# Patient Record
Sex: FEMALE | Race: WHITE | NOT HISPANIC OR LATINO | Employment: UNEMPLOYED | URBAN - NONMETROPOLITAN AREA
[De-identification: names, ages, dates, MRNs, and addresses within clinical notes are randomized per-mention and may not be internally consistent; named-entity substitution may affect disease eponyms.]

---

## 2018-01-02 ENCOUNTER — CONSULT (OUTPATIENT)
Dept: CARDIOLOGY | Facility: CLINIC | Age: 68
End: 2018-01-02

## 2018-01-02 ENCOUNTER — TELEPHONE (OUTPATIENT)
Dept: CARDIOLOGY | Facility: CLINIC | Age: 68
End: 2018-01-02

## 2018-01-02 VITALS
SYSTOLIC BLOOD PRESSURE: 130 MMHG | BODY MASS INDEX: 34.73 KG/M2 | DIASTOLIC BLOOD PRESSURE: 100 MMHG | HEART RATE: 72 BPM | WEIGHT: 196 LBS | HEIGHT: 63 IN

## 2018-01-02 DIAGNOSIS — R73.9 HYPERGLYCEMIA: ICD-10-CM

## 2018-01-02 DIAGNOSIS — I10 ESSENTIAL HYPERTENSION: ICD-10-CM

## 2018-01-02 DIAGNOSIS — I49.5 SSS (SICK SINUS SYNDROME) (HCC): ICD-10-CM

## 2018-01-02 DIAGNOSIS — R00.2 PALPITATIONS: ICD-10-CM

## 2018-01-02 DIAGNOSIS — I49.3 PVC (PREMATURE VENTRICULAR CONTRACTION): ICD-10-CM

## 2018-01-02 DIAGNOSIS — E88.81 METABOLIC SYNDROME: ICD-10-CM

## 2018-01-02 DIAGNOSIS — E83.42 HYPOMAGNESEMIA: ICD-10-CM

## 2018-01-02 DIAGNOSIS — I49.5 SSS (SICK SINUS SYNDROME) (HCC): Primary | ICD-10-CM

## 2018-01-02 DIAGNOSIS — R01.1 MURMUR, CARDIAC: ICD-10-CM

## 2018-01-02 DIAGNOSIS — E78.00 HYPERCHOLESTEREMIA: ICD-10-CM

## 2018-01-02 PROCEDURE — 99204 OFFICE O/P NEW MOD 45 MIN: CPT | Performed by: INTERNAL MEDICINE

## 2018-01-02 PROCEDURE — 93000 ELECTROCARDIOGRAM COMPLETE: CPT | Performed by: INTERNAL MEDICINE

## 2018-01-02 RX ORDER — LOSARTAN POTASSIUM 100 MG/1
100 TABLET ORAL DAILY
Qty: 30 TABLET | Refills: 0 | Status: SHIPPED | OUTPATIENT
Start: 2018-01-02 | End: 2018-02-22 | Stop reason: SDUPTHER

## 2018-01-02 RX ORDER — LOSARTAN POTASSIUM 100 MG/1
100 TABLET ORAL DAILY
COMMUNITY
End: 2018-01-02 | Stop reason: SDUPTHER

## 2018-01-02 RX ORDER — LOSARTAN POTASSIUM 100 MG/1
100 TABLET ORAL DAILY
Qty: 90 TABLET | Refills: 4 | Status: SHIPPED | OUTPATIENT
Start: 2018-01-02 | End: 2019-03-15 | Stop reason: SDUPTHER

## 2018-01-02 NOTE — PROGRESS NOTES
CARDIAC COMPLAINTS  dyspnea and palpitations      Subjective   Brandie Nesbitt is a 67 y.o. female came in today for her initial cardiac evaluation.  She has history of hypertension who was been having symptoms of shortness of breath and palpitation for which she has undergone investigation about 3 years ago in [place].  She was told that she needed a pacemaker and had it done in 2014.  She did notice some improvement in the shortness of breath but she continues to have the palpitation.  She now recently moved to [place] and wants to be followed here.  She does have an appointment to see you tomorrow.  She was on lipid lowering medication in the past, but she has stopped taking it.  She does have family history of diabetes but is not sure when was the last time her sugar level was checked.  She does complain of palpitation and during that time she also feels like a lump in her throat.  She denies having any chest pain.  Her shortness of breath has improved but still present.  She is not a smoker.  She does have strong family history of ischemic heart disease.    Past Surgical History:   Procedure Laterality Date   • CARDIOVASCULAR STRESS TEST     • ECHO - CONVERTED     • PACEMAKER IMPLANTATION  11/26/2014    [employer]tr[employer] @ [facility]       Current Outpatient Prescriptions   Medication Sig Dispense Refill   • losartan (COZAAR) 100 MG tablet Take 100 mg by mouth Daily.     • metoprolol tartrate (LOPRESSOR) 25 MG tablet Take 1 tablet by mouth Every 12 (Twelve) Hours. 60 tablet 11     No current facility-administered medications for this visit.            ALLERGIES:  Review of patient's allergies indicates no known allergies.    Past Medical History:   Diagnosis Date   • Breast abrasion    • History of lumpectomy    • History of tonsillectomy    • Hypertension    • Pacemaker     Medtronic       History   Smoking Status   • Never Smoker   Smokeless Tobacco   • Never Used          Family History   Problem  "Relation Age of Onset   • Heart attack Mother    • Hypertension Mother    • Hyperlipidemia Mother    • Cancer Mother    • Stroke Father    • Hyperlipidemia Father    • Hypertension Father    • Cancer Father    • Heart disease Sister    • Diabetes Sister    • Stroke Sister    • Heart disease Brother    • Diabetes Brother    • Stroke Brother    • Stroke Maternal Grandmother    • Heart attack Maternal Grandfather    • Heart attack Paternal Grandmother    • Stroke Paternal Grandmother    • Heart attack Paternal Grandfather    • Stroke Paternal Grandfather        Review of Systems   Constitution: Negative for decreased appetite and malaise/fatigue.   HENT: Negative for congestion and sore throat.    Eyes: Negative for blurred vision.   Cardiovascular: Positive for dyspnea on exertion and palpitations. Negative for chest pain.   Respiratory: Positive for shortness of breath. Negative for snoring.    Endocrine: Negative for cold intolerance and heat intolerance.   Hematologic/Lymphatic: Negative for adenopathy. Does not bruise/bleed easily.   Skin: Negative for itching, nail changes and skin cancer.   Musculoskeletal: Negative for arthritis, gout and myalgias.   Gastrointestinal: Negative for abdominal pain, dysphagia and heartburn.   Genitourinary: Negative for bladder incontinence and frequency.   Neurological: Negative for dizziness, light-headedness, seizures and vertigo.   Psychiatric/Behavioral: Negative for altered mental status.   Allergic/Immunologic: Negative for environmental allergies and hives.       Diabetes- No  Thyroid- normal    Objective     /100 (BP Location: Left arm)  Pulse 72  Ht 160 cm (63\")  Wt 88.9 kg (196 lb)  BMI 34.72 kg/m2    Physical Exam   Constitutional: She is oriented to person, place, and time. She appears well-developed and well-nourished.   HENT:   Head: Normocephalic.   Nose: Nose normal.   Eyes: EOM are normal. Pupils are equal, round, and reactive to light.   Neck: Normal " range of motion. Neck supple.   Cardiovascular: Normal rate, regular rhythm, S1 normal and S2 normal.  Frequent extrasystoles are present.   Murmur heard.  Pulmonary/Chest: Effort normal and breath sounds normal.   Abdominal: Soft. Bowel sounds are normal.   Musculoskeletal: Normal range of motion. She exhibits no edema.   Neurological: She is alert and oriented to person, place, and time.   Skin: Skin is warm and dry.   Psychiatric: She has a normal mood and affect.         ECG 12 Lead  Date/Time: 1/2/2018 12:01 PM  Performed by: LISSETT SANCHES  Authorized by: LISSETT SANCHES   Previous ECG: no previous ECG available  Rhythm: paced  Ectopy: unifocal PVCs  Rate: normal  QRS axis: normal  Clinical impression: abnormal ECG              Assessment/Plan     Brandie was seen today for establish care.    Diagnoses and all orders for this visit:    SSS (sick sinus syndrome)  -     Adult Transthoracic Echo Complete W/ Cont if Necessary Per Protocol; Future  -     metoprolol tartrate (LOPRESSOR) 25 MG tablet; Take 1 tablet by mouth Every 12 (Twelve) Hours.  -     TSH; Future  -     ECG 12 Lead    Essential hypertension  -     Comprehensive Metabolic Panel; Future    Metabolic syndrome  -     CBC & Differential; Future    Hypercholesteremia  -     Lipid Panel; Future    Hyperglycemia  -     Hemoglobin A1c; Future    Murmur, cardiac  -     Adult Transthoracic Echo Complete W/ Cont if Necessary Per Protocol; Future    Palpitations  -     metoprolol tartrate (LOPRESSOR) 25 MG tablet; Take 1 tablet by mouth Every 12 (Twelve) Hours.  -     TSH; Future  -     ECG 12 Lead    Hypomagnesemia  -     Magnesium; Future    PVC (premature ventricular contraction)  -     ECG 12 Lead       At baseline her heart rate is stable but little irregular.  Her blood pressure is moderately elevated.  Her BMI is around 35.  Her EKG showed that she's been paced atrially and also has few PVC's.  Her clinical examination is unremarkable other  than short systolic murmur at the mitral area.  I started her on low-dose of beta blockers in the form of Lopressor 25 mg twice a day.  I scheduled her to have the pacemaker interrogated.  If there is still continues to have a lot of PVC's, then we will try 1C antiarrhythmic medication.  Also advised her to undergo lab work to check her lipids, electrolytes, magnesium, A1c and TSH level.  I also scheduled her to undergo an echocardiogram to evaluate the LV systolic function, valvular structures.  Based on the results of the test, further recommendation will be made.             Electronically signed by Estrellita Weldon MD January 2, 2018 12:03 PM

## 2018-01-02 NOTE — TELEPHONE ENCOUNTER
Patient was in for consult today. Upon checking out she was asking for refills on Losartan 100 mg.   Is she to continue taking the Losartan?

## 2018-01-02 NOTE — TELEPHONE ENCOUNTER
Script for Losartan also sent to [facility]. Patient wanted 1st fill to go to [facility].

## 2018-01-02 NOTE — TELEPHONE ENCOUNTER
Patient requesting a script for Metoprolol  25 mg to be sent to [facility] which is [facility].

## 2018-01-04 ENCOUNTER — HOSPITAL ENCOUNTER (OUTPATIENT)
Dept: CARDIOLOGY | Facility: HOSPITAL | Age: 68
Discharge: HOME | End: 2018-01-04
Attending: INTERNAL MEDICINE | Admitting: INTERNAL MEDICINE

## 2018-01-04 ENCOUNTER — OUTSIDE FACILITY SERVICE (OUTPATIENT)
Dept: CARDIOLOGY | Facility: CLINIC | Age: 68
End: 2018-01-04

## 2018-01-04 DIAGNOSIS — R01.1 MURMUR, CARDIAC: ICD-10-CM

## 2018-01-04 DIAGNOSIS — I49.5 SSS (SICK SINUS SYNDROME) (HCC): ICD-10-CM

## 2018-01-04 LAB
MAXIMAL PREDICTED HEART RATE: 155 BPM
STRESS TARGET HR: 132 BPM

## 2018-01-04 PROCEDURE — 93306 TTE W/DOPPLER COMPLETE: CPT | Performed by: INTERNAL MEDICINE

## 2018-01-04 PROCEDURE — 93306 TTE W/DOPPLER COMPLETE: CPT

## 2018-01-15 ENCOUNTER — TELEPHONE (OUTPATIENT)
Dept: CARDIOLOGY | Facility: CLINIC | Age: 68
End: 2018-01-15

## 2018-01-15 NOTE — TELEPHONE ENCOUNTER
"Patient notified of Echo results. Verbalizes understanding.    Dr. Weldon    Patient reports is doing okay with Beta blocker but her b/p still \" a little high\", upon further conversation with patient discovered she was only taking metoprolol once a day and it was ordered twice a day.     I advised patient to take as directed twice a day and if b/p continues to be elevated to contact our office. What are your recommendation's?      Questioned patient as to b/p reading's and she would states \"just a little high\".  "

## 2018-01-15 NOTE — TELEPHONE ENCOUNTER
Patient called in requesting result's of Echo. What are your recommendation's as to Echo? Thank you.

## 2018-01-15 NOTE — TELEPHONE ENCOUNTER
patient called stating she had an echo performed about 2 weeks ago and has yet to her back from those results. I spoke with patient and informed her that since she is followed by Dr. Weldon she would need to call their office for those results. Patient verbalize understanding.

## 2018-02-22 ENCOUNTER — TELEPHONE (OUTPATIENT)
Dept: CARDIOLOGY | Facility: CLINIC | Age: 68
End: 2018-02-22

## 2018-02-22 ENCOUNTER — CLINICAL SUPPORT (OUTPATIENT)
Dept: CARDIOLOGY | Facility: CLINIC | Age: 68
End: 2018-02-22

## 2018-02-22 DIAGNOSIS — I49.5 SSS (SICK SINUS SYNDROME) (HCC): Primary | ICD-10-CM

## 2018-02-22 PROCEDURE — 93000 ELECTROCARDIOGRAM COMPLETE: CPT | Performed by: INTERNAL MEDICINE

## 2018-02-22 RX ORDER — CHOLECALCIFEROL (VITAMIN D3) 50 MCG
2000 TABLET ORAL DAILY
COMMUNITY
End: 2023-01-17

## 2018-02-22 RX ORDER — ASPIRIN 325 MG
81 TABLET, DELAYED RELEASE (ENTERIC COATED) ORAL DAILY
COMMUNITY
End: 2019-07-02 | Stop reason: ALTCHOICE

## 2018-02-22 RX ORDER — ATORVASTATIN CALCIUM 10 MG/1
10 TABLET, FILM COATED ORAL DAILY
COMMUNITY
End: 2019-07-02 | Stop reason: SDUPTHER

## 2018-02-22 NOTE — PROGRESS NOTES
Procedure     ECG 12 Lead  Date/Time: 2/22/2018 2:34 PM  Performed by: LISSETT SANCHES  Authorized by: LISSETT SANCHES   Comparison: compared with previous ECG from 1/2/2018  Similar to previous ECG  Rhythm: paced  Ectopy: PVCs  Rate: normal  QRS axis: normal  Clinical impression: abnormal ECG

## 2018-02-22 NOTE — TELEPHONE ENCOUNTER
Pt was at physical therapy this morning.  States her pulse was 39 during the treatment. When I checked vitals her radial pulse was 44, apical was 65 and irreg, /80. EKG completed for you to review. Pt has PPM check scheduled for 2/28/18.  Med list up dated.  Pt complained of having congested cough/increased SOB. Advised to see PCP to R/O pneumonia due to just getting over the flu last week.

## 2018-02-28 ENCOUNTER — OFFICE VISIT (OUTPATIENT)
Dept: CARDIOLOGY | Facility: CLINIC | Age: 68
End: 2018-02-28

## 2018-02-28 DIAGNOSIS — I49.5 SSS (SICK SINUS SYNDROME) (HCC): Primary | ICD-10-CM

## 2018-02-28 DIAGNOSIS — R00.2 PALPITATIONS: ICD-10-CM

## 2018-02-28 PROCEDURE — 93288 INTERROG EVL PM/LDLS PM IP: CPT | Performed by: INTERNAL MEDICINE

## 2018-07-10 ENCOUNTER — OFFICE VISIT (OUTPATIENT)
Dept: CARDIOLOGY | Facility: CLINIC | Age: 68
End: 2018-07-10

## 2018-07-10 VITALS
WEIGHT: 192 LBS | BODY MASS INDEX: 34.02 KG/M2 | SYSTOLIC BLOOD PRESSURE: 120 MMHG | HEIGHT: 63 IN | HEART RATE: 60 BPM | DIASTOLIC BLOOD PRESSURE: 78 MMHG

## 2018-07-10 DIAGNOSIS — R00.2 PALPITATIONS: ICD-10-CM

## 2018-07-10 DIAGNOSIS — E78.00 HYPERCHOLESTEREMIA: ICD-10-CM

## 2018-07-10 DIAGNOSIS — I49.3 PVC (PREMATURE VENTRICULAR CONTRACTION): ICD-10-CM

## 2018-07-10 DIAGNOSIS — I10 ESSENTIAL HYPERTENSION: ICD-10-CM

## 2018-07-10 DIAGNOSIS — I49.5 SSS (SICK SINUS SYNDROME) (HCC): Primary | ICD-10-CM

## 2018-07-10 PROCEDURE — 93000 ELECTROCARDIOGRAM COMPLETE: CPT | Performed by: NURSE PRACTITIONER

## 2018-07-10 PROCEDURE — 99214 OFFICE O/P EST MOD 30 MIN: CPT | Performed by: NURSE PRACTITIONER

## 2018-07-10 RX ORDER — MELOXICAM 15 MG/1
15 TABLET ORAL DAILY PRN
COMMUNITY

## 2018-07-10 NOTE — PROGRESS NOTES
Chief Complaint   Patient presents with   • Follow-up     Cardiac management. Last labs about 6 weeks ago per PCP, and 2 weeks with urologist office.   • Pacemaker Check     Last MDT PPM check 2/28/18.       Diane Nesbitt is a 67 y.o. female with a history of hypertension and hyperlipidemia who developed shortness of breath and palpitations in 2014 and underwent investigation in [place] followed by Medtronic pacemaker placement.  She established care in January 2018 after moving to [place]. She reported palpitations at consult and was noted to have atrial pacing with occasional PVC.  Lopressor 25 mg BID added and labs and echo ordered.  Echo showed normal LV function and only mild MR. PA pressure borderline at 31 mmHg. Pacemaker interrogation on 2/28/18 showed 99% atrial pacing, 0.3% ventricular pacing with seven years battery life remaining. She came in today for follow up. She is tolerating beta blocker well and palpitations have significantly decreased. She denies chest pain or shortness of breath. No dizziness or syncope. Labs were done with PCP and was told lipids controlled but A1C 7.0%.  She was advised on strict diet and attended classes at the health department. She has lost four pounds. She was advised to undergo sleep apnea work up by PCP but has not done so yet. She had a repeat CT scan yesterday secondary to 2 mm renal stone that she feels she has now passed.  No refills needed.      HPI         Cardiac History:    Past Surgical History:   Procedure Laterality Date   • CARDIOVASCULAR STRESS TEST     • ECHO - CONVERTED     • ECHO - CONVERTED  01/04/2018    EF 65%. RVSP- 31 mmHg   • PACEMAKER IMPLANTATION  11/26/2014    [employer]tr[employer] @ [facility]]       Current Outpatient Prescriptions   Medication Sig Dispense Refill   • aspirin  MG tablet Take 81 mg by mouth Daily.     • atorvastatin (LIPITOR) 10 MG tablet Take 10 mg by mouth Daily.     • Cholecalciferol (VITAMIN D) 2000 units  tablet Take 2,000 Units by mouth Daily.     • cyanocobalamin ([facility] VITAMIN B-12) 2000 MCG tablet Take 2,000 mcg by mouth Daily.     • losartan (COZAAR) 100 MG tablet Take 1 tablet by mouth Daily. 90 tablet 4   • meloxicam (MOBIC) 15 MG tablet Take 15 mg by mouth Daily.     • metoprolol tartrate (LOPRESSOR) 25 MG tablet Take 1 tablet by mouth Every 12 (Twelve) Hours. 90 tablet 4     No current facility-administered medications for this visit.        Patient has no known allergies.    Past Medical History:   Diagnosis Date   • Breast abrasion    • Diabetes mellitus (CMS/HCC)    • History of kidney stones    • History of lumpectomy    • History of tonsillectomy    • Hypertension    • Pacemaker     Medtronic       Social History     Social History   • Marital status: Unknown     Spouse name: N/A   • Number of children: N/A   • Years of education: N/A     Occupational History   • Not on file.     Social History Main Topics   • Smoking status: Never Smoker   • Smokeless tobacco: Never Used   • Alcohol use Yes      Comment: occasionally   • Drug use: No   • Sexual activity: Not on file     Other Topics Concern   • Not on file     Social History Narrative   • No narrative on file       Family History   Problem Relation Age of Onset   • Heart attack Mother    • Hypertension Mother    • Hyperlipidemia Mother    • Cancer Mother    • Stroke Father    • Hyperlipidemia Father    • Hypertension Father    • Cancer Father    • Heart disease Sister    • Diabetes Sister    • Stroke Sister    • Heart disease Brother    • Diabetes Brother    • Stroke Brother    • Stroke Maternal Grandmother    • Heart attack Maternal Grandfather    • Heart attack Paternal Grandmother    • Stroke Paternal Grandmother    • Heart attack Paternal Grandfather    • Stroke Paternal Grandfather        Review of Systems   Constitution: Positive for weight loss (down 4 lb). Negative for decreased appetite, weakness and malaise/fatigue.   HENT: Negative.    Eyes:  "Negative.    Cardiovascular: Negative for chest pain, dyspnea on exertion, leg swelling, orthopnea, palpitations and syncope.   Respiratory: Negative for cough and shortness of breath.    Endocrine: Negative.    Hematologic/Lymphatic: Negative for bleeding problem. Does not bruise/bleed easily.   Skin: Negative.    Musculoskeletal: Negative for falls and myalgias.   Gastrointestinal: Negative for abdominal pain, change in bowel habit, constipation, diarrhea and melena.   Genitourinary: Positive for hematuria (reent renal calculi).   Neurological: Negative for dizziness.   Psychiatric/Behavioral: Negative for altered mental status and depression.   Allergic/Immunologic: Negative.       Diabetes- Yes  Thyroid-normal    Objective     /78 (BP Location: Left arm)   Pulse 60   Ht 160 cm (62.99\")   Wt 87.1 kg (192 lb)   BMI 34.02 kg/m²     Physical Exam   Constitutional: She is oriented to person, place, and time. She appears well-developed and well-nourished.   HENT:   Head: Normocephalic.   Eyes: Pupils are equal, round, and reactive to light.   Neck: Normal range of motion.   Cardiovascular: Normal rate, regular rhythm and intact distal pulses.    Pulmonary/Chest: Effort normal and breath sounds normal. No respiratory distress. She has no wheezes. She has no rales.   Abdominal: Soft. Bowel sounds are normal. She exhibits no distension.   Musculoskeletal: Normal range of motion. She exhibits no edema.   Neurological: She is alert and oriented to person, place, and time.   Skin: Skin is warm and dry.   Psychiatric: She has a normal mood and affect.   Nursing note and vitals reviewed.       ECG 12 Lead  Date/Time: 7/10/2018 2:50 PM  Performed by: MARKO SONG  Authorized by: MARKO SONG   Rhythm: paced  Rate: normal  BPM: 59  Clinical impression: non-specific ECG  Comments: Atrial pacing, ventricular sensing  No PVC                  Assessment/Plan    Heart rate stable. Blood pressure is normal today. EKG done " today for pacemaker management showed atrial pacing, ventricular sensing with no PVC noted.  She is tolerating Lopressor which seems to be suppressing the ventricular beats.  Continue the same.  We reviewed the findings of the echocardiogram which were stable.  Stress test not done as she is asymptomatic. We discussed the A1C and diet in detail.  She was given DASH diet and advised to strictly limit carbohydrates. We discussed her medication and she is on aspirin, statin, ARB, and BB.  No changes made.  She will be scheduled for Medtronic pacemaker interrogation next month. Will request labs from Karlee's office for her chart here.  No refills needed today.  If she decides to proceed with sleep apnea work up, she will discuss with you further.  We will see her back in six months or sooner if any new symptoms develop.      Brandie was seen today for follow-up and pacemaker check.    Diagnoses and all orders for this visit:    SSS (sick sinus syndrome) (CMS/HCC)    Essential hypertension    Hypercholesteremia    Palpitations    PVC (premature ventricular contraction)    Other orders  -     ECG 12 Lead        Patient's Body mass index is 34.02 kg/m². BMI is above normal parameters. Recommendations include: nutrition counseling.                   Electronically signed by TROY Bonilla,  July 10, 2018 2:57 PM

## 2018-07-10 NOTE — PATIENT INSTRUCTIONS
"DASH Eating Plan  DASH stands for \"Dietary Approaches to Stop Hypertension.\" The DASH eating plan is a healthy eating plan that has been shown to reduce high blood pressure (hypertension). It may also reduce your risk for type 2 diabetes, heart disease, and stroke. The DASH eating plan may also help with weight loss.  What are tips for following this plan?  General guidelines  · Avoid eating more than 2,300 mg (milligrams) of salt (sodium) a day. If you have hypertension, you may need to reduce your sodium intake to 1,500 mg a day.  · Limit alcohol intake to no more than 1 drink a day for nonpregnant women and 2 drinks a day for men. One drink equals 12 oz of beer, 5 oz of wine, or 1½ oz of hard liquor.  · Work with your health care provider to maintain a healthy body weight or to lose weight. Ask what an ideal weight is for you.  · Get at least 30 minutes of exercise that causes your heart to beat faster (aerobic exercise) most days of the week. Activities may include walking, swimming, or biking.  · Work with your health care provider or diet and nutrition specialist (dietitian) to adjust your eating plan to your individual calorie needs.  Reading food labels  · Check food labels for the amount of sodium per serving. Choose foods with less than 5 percent of the Daily Value of sodium. Generally, foods with less than 300 mg of sodium per serving fit into this eating plan.  · To find whole grains, look for the word \"whole\" as the first word in the ingredient list.  Shopping  · Buy products labeled as \"low-sodium\" or \"no salt added.\"  · Buy fresh foods. Avoid canned foods and premade or frozen meals.  Cooking  · Avoid adding salt when cooking. Use salt-free seasonings or herbs instead of table salt or sea salt. Check with your health care provider or pharmacist before using salt substitutes.  · Do not eid foods. Cook foods using healthy methods such as baking, boiling, grilling, and broiling instead.  · Cook with " heart-healthy oils, such as olive, canola, soybean, or sunflower oil.  Meal planning    · Eat a balanced diet that includes:  ? 5 or more servings of fruits and vegetables each day. At each meal, try to fill half of your plate with fruits and vegetables.  ? Up to 6-8 servings of whole grains each day.  ? Less than 6 oz of lean meat, poultry, or fish each day. A 3-oz serving of meat is about the same size as a deck of cards. One egg equals 1 oz.  ? 2 servings of low-fat dairy each day.  ? A serving of nuts, seeds, or beans 5 times each week.  ? Heart-healthy fats. Healthy fats called Omega-3 fatty acids are found in foods such as flaxseeds and coldwater fish, like sardines, salmon, and mackerel.  · Limit how much you eat of the following:  ? Canned or prepackaged foods.  ? Food that is high in trans fat, such as fried foods.  ? Food that is high in saturated fat, such as fatty meat.  ? Sweets, desserts, sugary drinks, and other foods with added sugar.  ? Full-fat dairy products.  · Do not salt foods before eating.  · Try to eat at least 2 vegetarian meals each week.  · Eat more home-cooked food and less restaurant, buffet, and fast food.  · When eating at a restaurant, ask that your food be prepared with less salt or no salt, if possible.  What foods are recommended?  The items listed may not be a complete list. Talk with your dietitian about what dietary choices are best for you.  Grains  Whole-grain or whole-wheat bread. Whole-grain or whole-wheat pasta. Brown rice. Oatmeal. Quinoa. Bulgur. Whole-grain and low-sodium cereals. Ilana bread. Low-fat, low-sodium crackers. Whole-wheat flour tortillas.  Vegetables  Fresh or frozen vegetables (raw, steamed, roasted, or grilled). Low-sodium or reduced-sodium tomato and vegetable juice. Low-sodium or reduced-sodium tomato sauce and tomato paste. Low-sodium or reduced-sodium canned vegetables.  Fruits  All fresh, dried, or frozen fruit. Canned fruit in natural juice (without  added sugar).  Meat and other protein foods  Skinless chicken or turkey. Ground chicken or turkey. Pork with fat trimmed off. Fish and seafood. Egg whites. Dried beans, peas, or lentils. Unsalted nuts, nut butters, and seeds. Unsalted canned beans. Lean cuts of beef with fat trimmed off. Low-sodium, lean deli meat.  Dairy  Low-fat (1%) or fat-free (skim) milk. Fat-free, low-fat, or reduced-fat cheeses. Nonfat, low-sodium ricotta or cottage cheese. Low-fat or nonfat yogurt. Low-fat, low-sodium cheese.  Fats and oils  Soft margarine without trans fats. Vegetable oil. Low-fat, reduced-fat, or light mayonnaise and salad dressings (reduced-sodium). Canola, safflower, olive, soybean, and sunflower oils. Avocado.  Seasoning and other foods  Herbs. Spices. Seasoning mixes without salt. Unsalted popcorn and pretzels. Fat-free sweets.  What foods are not recommended?  The items listed may not be a complete list. Talk with your dietitian about what dietary choices are best for you.  Grains  Baked goods made with fat, such as croissants, muffins, or some breads. Dry pasta or rice meal packs.  Vegetables  Creamed or fried vegetables. Vegetables in a cheese sauce. Regular canned vegetables (not low-sodium or reduced-sodium). Regular canned tomato sauce and paste (not low-sodium or reduced-sodium). Regular tomato and vegetable juice (not low-sodium or reduced-sodium). Pickles. Olives.  Fruits  Canned fruit in a light or heavy syrup. Fried fruit. Fruit in cream or butter sauce.  Meat and other protein foods  Fatty cuts of meat. Ribs. Fried meat. Beach. Sausage. Bologna and other processed lunch meats. Salami. Fatback. Hotdogs. Bratwurst. Salted nuts and seeds. Canned beans with added salt. Canned or smoked fish. Whole eggs or egg yolks. Chicken or turkey with skin.  Dairy  Whole or 2% milk, cream, and half-and-half. Whole or full-fat cream cheese. Whole-fat or sweetened yogurt. Full-fat cheese. Nondairy creamers. Whipped toppings.  Processed cheese and cheese spreads.  Fats and oils  Butter. Stick margarine. Lard. Shortening. Ghee. Beach fat. Tropical oils, such as coconut, palm kernel, or palm oil.  Seasoning and other foods  Salted popcorn and pretzels. Onion salt, garlic salt, seasoned salt, table salt, and sea salt. Worcestershire sauce. Tartar sauce. Barbecue sauce. Teriyaki sauce. Soy sauce, including reduced-sodium. Steak sauce. Canned and packaged gravies. Fish sauce. Oyster sauce. Cocktail sauce. Horseradish that you find on the shelf. Ketchup. Mustard. Meat flavorings and tenderizers. Bouillon cubes. Hot sauce and Tabasco sauce. Premade or packaged marinades. Premade or packaged taco seasonings. Relishes. Regular salad dressings.  Where to find more information:  · National Heart, Lung, and Blood [place]: www.nhlbi.nih.gov  · American Heart Association: www.heart.org  Summary  · The DASH eating plan is a healthy eating plan that has been shown to reduce high blood pressure (hypertension). It may also reduce your risk for type 2 diabetes, heart disease, and stroke.  · With the DASH eating plan, you should limit salt (sodium) intake to 2,300 mg a day. If you have hypertension, you may need to reduce your sodium intake to 1,500 mg a day.  · When on the DASH eating plan, aim to eat more fresh fruits and vegetables, whole grains, lean proteins, low-fat dairy, and heart-healthy fats.  · Work with your health care provider or diet and nutrition specialist (dietitian) to adjust your eating plan to your individual calorie needs.  This information is not intended to replace advice given to you by your health care provider. Make sure you discuss any questions you have with your health care provider.  Document Released: 12/06/2012 Document Revised: 12/11/2017 Document Reviewed: 12/11/2017  [employer] Patient Education © 2018 [employer]

## 2018-08-22 ENCOUNTER — OFFICE VISIT (OUTPATIENT)
Dept: CARDIOLOGY | Facility: CLINIC | Age: 68
End: 2018-08-22

## 2018-08-22 DIAGNOSIS — R00.2 PALPITATIONS: ICD-10-CM

## 2018-08-22 DIAGNOSIS — I49.5 SSS (SICK SINUS SYNDROME) (HCC): Primary | ICD-10-CM

## 2018-08-22 PROCEDURE — 93288 INTERROG EVL PM/LDLS PM IP: CPT | Performed by: INTERNAL MEDICINE

## 2018-12-10 ENCOUNTER — OFFICE VISIT (OUTPATIENT)
Dept: CARDIOLOGY | Facility: CLINIC | Age: 68
End: 2018-12-10

## 2018-12-10 VITALS
DIASTOLIC BLOOD PRESSURE: 84 MMHG | HEART RATE: 71 BPM | BODY MASS INDEX: 34.02 KG/M2 | HEIGHT: 63 IN | WEIGHT: 192 LBS | SYSTOLIC BLOOD PRESSURE: 130 MMHG

## 2018-12-10 DIAGNOSIS — Z95.0 PRESENCE OF PERMANENT CARDIAC PACEMAKER: ICD-10-CM

## 2018-12-10 DIAGNOSIS — E78.00 HYPERCHOLESTEREMIA: ICD-10-CM

## 2018-12-10 DIAGNOSIS — E66.9 OBESITY (BMI 30.0-34.9): ICD-10-CM

## 2018-12-10 DIAGNOSIS — I49.5 SSS (SICK SINUS SYNDROME) (HCC): Primary | ICD-10-CM

## 2018-12-10 DIAGNOSIS — I10 ESSENTIAL HYPERTENSION: ICD-10-CM

## 2018-12-10 DIAGNOSIS — I49.3 PVC (PREMATURE VENTRICULAR CONTRACTION): ICD-10-CM

## 2018-12-10 PROCEDURE — 93000 ELECTROCARDIOGRAM COMPLETE: CPT | Performed by: NURSE PRACTITIONER

## 2018-12-10 PROCEDURE — 99214 OFFICE O/P EST MOD 30 MIN: CPT | Performed by: NURSE PRACTITIONER

## 2018-12-10 RX ORDER — METOPROLOL TARTRATE 50 MG/1
50 TABLET, FILM COATED ORAL 2 TIMES DAILY
Qty: 180 TABLET | Refills: 3 | Status: SHIPPED | OUTPATIENT
Start: 2018-12-10 | End: 2018-12-10 | Stop reason: DRUGHIGH

## 2018-12-10 NOTE — PROGRESS NOTES
Chief Complaint   Patient presents with   • Follow-up     For cardiac management. Patient is on aspirin. Last lab work was done a couple of days ago per PCP, not in chart.    • Pacemaker Check     Last MDT PPM check was done on 08/22/18 per our office.    • Med Refill     Did not bring medication list.        Cardiac Complaints  none      Subjective   Brandie Nesbitt is a 68 y.o. female with hypertension and hyperlipidemia who developed shortness of breath and palpitations in 2014 and underwent investigation in [place] followed by Medtronic pacemaker placement for SSS.  She established care in January 2018 after moving to [place]. She reported palpitations at consult and was noted to have atrial pacing with occasional PVCs.  Lopressor 25 mg BID was added and labs and echo ordered.  Echo showed normal LV function and only mild MR. PA pressure borderline at 31 mmHg. Most recent pacer check in August showed 99% atrial pacing with 7 years of battery life remaining and normal function.  She returns today for follow up and cardiac symptoms are denied.  When questioned about palpitations, patient reports she has lived with them so long she does not notice them any longer.  Chest pain and shortness of breath are denied.  Labs she admits were done several days ago with PCP.  Most recent show BUN 20, creatinine 0.9, Na 137, K 4.3, trig 126, HDL 32, LDL 78, and AIC 6.5%. No refills needed as they are done with PCP.        Cardiac History  Past Surgical History:   Procedure Laterality Date   • CARDIOVASCULAR STRESS TEST     • ECHO - CONVERTED     • ECHO - CONVERTED  01/04/2018    EF 65%. RVSP- 31 mmHg   • PACEMAKER IMPLANTATION  11/26/2014    [employer]tr[employer] @ [facility]       Current Outpatient Medications   Medication Sig Dispense Refill   • aspirin  MG tablet Take 81 mg by mouth Daily.     • atorvastatin (LIPITOR) 10 MG tablet Take 10 mg by mouth Daily.     • Cholecalciferol (VITAMIN D) 2000 units tablet Take 2,000 Units  by mouth Daily.     • cyanocobalamin (CVS VITAMIN B-12) 2000 MCG tablet Take 2,000 mcg by mouth Daily.     • losartan (COZAAR) 100 MG tablet Take 1 tablet by mouth Daily. 90 tablet 4   • meloxicam (MOBIC) 15 MG tablet Take 15 mg by mouth Daily.     • metoprolol tartrate (LOPRESSOR) 25 MG tablet Take 1 tablet by mouth 2 (Two) Times a Day. 60 tablet 11     No current facility-administered medications for this visit.        Patient has no known allergies.    Past Medical History:   Diagnosis Date   • Breast abrasion    • Diabetes mellitus (CMS/HCC)    • History of kidney stones    • History of lumpectomy    • History of tonsillectomy    • Hypertension    • Pacemaker     Medtronic       Social History     Socioeconomic History   • Marital status: Unknown     Spouse name: Not on file   • Number of children: Not on file   • Years of education: Not on file   • Highest education level: Not on file   Social Needs   • Financial resource strain: Not on file   • Food insecurity - worry: Not on file   • Food insecurity - inability: Not on file   • Transportation needs - medical: Not on file   • Transportation needs - non-medical: Not on file   Occupational History   • Not on file   Tobacco Use   • Smoking status: Never Smoker   • Smokeless tobacco: Never Used   Substance and Sexual Activity   • Alcohol use: Yes     Comment: occasionally   • Drug use: No   • Sexual activity: Not on file   Other Topics Concern   • Not on file   Social History Narrative   • Not on file       Family History   Problem Relation Age of Onset   • Heart attack Mother    • Hypertension Mother    • Hyperlipidemia Mother    • Cancer Mother    • Stroke Father    • Hyperlipidemia Father    • Hypertension Father    • Cancer Father    • Heart disease Sister    • Diabetes Sister    • Stroke Sister    • Heart disease Brother    • Diabetes Brother    • Stroke Brother    • Stroke Maternal Grandmother    • Heart attack Maternal Grandfather    • Heart attack Paternal  "Grandmother    • Stroke Paternal Grandmother    • Heart attack Paternal Grandfather    • Stroke Paternal Grandfather        Review of Systems   Constitution: Negative for weakness and malaise/fatigue.   Cardiovascular: Negative for chest pain, dyspnea on exertion, irregular heartbeat, leg swelling, orthopnea, palpitations and syncope.   Respiratory: Negative for cough, shortness of breath and wheezing.    Musculoskeletal: Negative for back pain, joint pain and joint swelling.   Gastrointestinal: Negative for anorexia, heartburn, nausea and vomiting.   Genitourinary: Negative for dysuria, hematuria, hesitancy and nocturia.   Neurological: Negative for dizziness, light-headedness and loss of balance.   Psychiatric/Behavioral: Negative for depression and memory loss. The patient is not nervous/anxious.            Objective     /84 (BP Location: Left arm)   Pulse 71   Ht 160 cm (62.99\")   Wt 87.1 kg (192 lb)   BMI 34.02 kg/m²     Physical Exam   Constitutional: She is oriented to person, place, and time. She appears well-developed and well-nourished.   HENT:   Head: Normocephalic and atraumatic.   Eyes: EOM are normal. Pupils are equal, round, and reactive to light.   Neck: Normal range of motion. Neck supple.   Cardiovascular: Normal rate. A regularly irregular rhythm present.   Murmur heard.  Pulmonary/Chest: Effort normal and breath sounds normal.   Abdominal: Soft.   Musculoskeletal: Normal range of motion.   Neurological: She is alert and oriented to person, place, and time.   Skin: Skin is warm and dry.   Psychiatric: She has a normal mood and affect. Her behavior is normal.         ECG 12 Lead  Date/Time: 12/10/2018 9:19 AM  Performed by: Michelle Ramos APRN  Authorized by: Michelle Ramos APRN   Rhythm: paced  Ectopy: unifocal PVCs and frequent PVCs  BPM: 71  Clinical impression: abnormal ECG            Assessment/Plan     HR is stable today and BP high side of normal.  Patient admits to only " taking metoprolol once daily most days and was urged to increase to BID dosing as recommended.  EKG done today for SSS with PPM and PVC shows an atrial paced rhythm with trigeminal pattern, cardiac symptoms denied.  No symptoms are reported and palpitations are denied.  For now, since she is only taking metoprolol once daily, she will be urged to add the evening dose.  Labs were recently done with your office, copy available shows no electrolyte imbalance.  Lipids remain managed with statin therapy, no current FLP available, but tolerance of medication reported.  BMI elevated at 34.02, good cardiac diet with limited caloric intake, saturated fat, and activity as tolerated advised.  6 month follow up scheduled or sooner if needed.        Problems Addressed this Visit        Cardiovascular and Mediastinum    SSS (sick sinus syndrome) (CMS/HCC) - Primary    Relevant Medications    metoprolol tartrate (LOPRESSOR) 25 MG tablet    Other Relevant Orders    ECG 12 Lead    Essential hypertension    Relevant Medications    metoprolol tartrate (LOPRESSOR) 25 MG tablet    Hypercholesteremia    PVC (premature ventricular contraction)    Relevant Medications    metoprolol tartrate (LOPRESSOR) 25 MG tablet    Other Relevant Orders    ECG 12 Lead      Other Visit Diagnoses     Obesity (BMI 30.0-34.9)        Presence of permanent cardiac pacemaker              Patient's Body mass index is 34.02 kg/m². BMI is above normal parameters. Recommendations include: nutrition counseling.        Electronically signed by TROY Moncada December 10, 2018 3:49 PM

## 2018-12-17 ENCOUNTER — TELEPHONE (OUTPATIENT)
Dept: CARDIOLOGY | Facility: CLINIC | Age: 68
End: 2018-12-17

## 2018-12-17 NOTE — TELEPHONE ENCOUNTER
Patient called and reports that she has been feeling her PVC's since last week and reports that she has been feeling bad when having them. She reports that when she received metoprolol from pharmacy and it was 50 mg BID. I do not see where it was increased. She reports that she had just been taking 25 mg BID, but that this morning she did take 50 mg and she is still feeling bad.       She is wondering if you have any recommendations?

## 2018-12-17 NOTE — TELEPHONE ENCOUNTER
Patient was made aware of change of beta blocker to sotalol 40 mg BID.     She is asking if you thought it would be ok to just take metoprolol 50 mg BID and see if that helps before switching to the sotalol due to cost. I told her I would talk with you and call her back.

## 2018-12-17 NOTE — TELEPHONE ENCOUNTER
Patient aware ok to take metoprolol 50 mg BID and to call us back with any problems. No EKG at this time.

## 2018-12-21 ENCOUNTER — TELEPHONE (OUTPATIENT)
Dept: CARDIOLOGY | Facility: CLINIC | Age: 68
End: 2018-12-21

## 2018-12-21 RX ORDER — DILTIAZEM HYDROCHLORIDE 120 MG/1
120 CAPSULE, COATED, EXTENDED RELEASE ORAL DAILY
Qty: 30 CAPSULE | Refills: 5 | Status: SHIPPED | OUTPATIENT
Start: 2018-12-21 | End: 2019-07-02 | Stop reason: ALTCHOICE

## 2018-12-21 NOTE — TELEPHONE ENCOUNTER
I will not be here for one week.  I had tried to start her on antiarrythmic therapy which she declined.  I can not do that now as I can not monitor her rhythm.  Decrease back to the 25mg BID and add cardizem 120mg.  If no better, must go to ER

## 2018-12-21 NOTE — TELEPHONE ENCOUNTER
Patient came by office this morning and stated that she is still having PVC's and it is hard for her to sleep. I called patient back and she reports that taking the metoprolol 50 mg BID has not improved them and she almost feels worse.      What are your recommendations?

## 2018-12-21 NOTE — TELEPHONE ENCOUNTER
Patient was made aware to decrease metoprolol to 25 mg BID and made aware of addition of cardizem 120 mg QD. She was made aware that if symptoms do not improve that she must go to the ER, she verbalized understanding.     Script for cardizem  mg QD sent to [facility] in [place] with 30 tablets and 5 refills.

## 2019-01-07 ENCOUNTER — TELEPHONE (OUTPATIENT)
Dept: CARDIOLOGY | Facility: CLINIC | Age: 69
End: 2019-01-07

## 2019-01-07 NOTE — TELEPHONE ENCOUNTER
PATIENT CALLED AND LEFT A VM THAT NEEDED AN APPT. PATIENT WAS JUST SEEN 12/10/2018 SO COULD YOU PLEASE CALL PATIENT TOMORROW AND SEE WHAT IS GOING ON?  THANK YOU.

## 2019-01-08 NOTE — TELEPHONE ENCOUNTER
Patient called and stated that she has been having PVC's for a couple of weeks and that she want so to see Dr. Weldon.     Patient was asked if she wanted me to speak with Michelle about cardizem  mg QD not controlling. She said that she did not want that and she only wanted an appointment with Dr. Weldon only. Per telephone encounter on 12/17/18 after Michelle recommended changing metoprolol 50 mg BID to sotalol 40 mg BID, patient refused changing to sotalol due to cost.

## 2019-02-17 DIAGNOSIS — I49.5 SSS (SICK SINUS SYNDROME) (HCC): ICD-10-CM

## 2019-02-17 DIAGNOSIS — R00.2 PALPITATIONS: ICD-10-CM

## 2019-03-18 RX ORDER — LOSARTAN POTASSIUM 100 MG/1
TABLET ORAL
Qty: 90 TABLET | Refills: 0 | Status: SHIPPED | OUTPATIENT
Start: 2019-03-18 | End: 2019-07-02 | Stop reason: SDUPTHER

## 2019-03-18 NOTE — TELEPHONE ENCOUNTER
Spoke with patient concerning current medication, patient reports she is taking Losartan 100mg daily.

## 2019-05-22 ENCOUNTER — OFFICE VISIT (OUTPATIENT)
Dept: CARDIOLOGY | Facility: CLINIC | Age: 69
End: 2019-05-22

## 2019-05-22 DIAGNOSIS — I49.5 SSS (SICK SINUS SYNDROME) (HCC): Primary | ICD-10-CM

## 2019-05-22 DIAGNOSIS — R00.2 PALPITATIONS: ICD-10-CM

## 2019-05-22 PROCEDURE — 93288 INTERROG EVL PM/LDLS PM IP: CPT | Performed by: INTERNAL MEDICINE

## 2019-07-01 NOTE — PROGRESS NOTES
No chief complaint on file.      Subjective       Brandie Nesbitt is a 68 y.o. female  with hypertension and hyperlipidemia who developed shortness of breath and palpitations in 2014 and underwent investigation in [place] followed by Medtronic pacemaker placement for SSS.  She established care in January 2018 after moving to [place]. She reported palpitations at consult and was noted to have atrial pacing with occasional PVCs.  Lopressor 25 mg BID was added and labs and echo ordered.  Echo showed normal LV function and only mild MR. PA pressure borderline at 31 mmHg. [facility] [place]     Cardiac History:    Past Surgical History:   Procedure Laterality Date   • CARDIOVASCULAR STRESS TEST     • ECHO - CONVERTED     • ECHO - CONVERTED  01/04/2018    EF 65%. RVSP- 31 mmHg   • PACEMAKER IMPLANTATION  11/26/2014    [employer]tr[employer] @ [facility]       Current Outpatient Medications   Medication Sig Dispense Refill   • aspirin  MG tablet Take 81 mg by mouth Daily.     • atorvastatin (LIPITOR) 10 MG tablet Take 10 mg by mouth Daily.     • Cholecalciferol (VITAMIN D) 2000 units tablet Take 2,000 Units by mouth Daily.     • cyanocobalamin (CVS VITAMIN B-12) 2000 MCG tablet Take 2,000 mcg by mouth Daily.     • diltiaZEM CD (CARDIZEM CD) 120 MG 24 hr capsule Take 1 capsule by mouth Daily. 30 capsule 5   • losartan (COZAAR) 100 MG tablet TAKE 1 TABLET DAILY 90 tablet 0   • meloxicam (MOBIC) 15 MG tablet Take 15 mg by mouth Daily.     • metoprolol tartrate (LOPRESSOR) 25 MG tablet Take 1 tablet by mouth 2 (Two) Times a Day. 60 tablet 11   • metoprolol tartrate (LOPRESSOR) 25 MG tablet TAKE 1 TABLET EVERY 12     HOURS 90 tablet 0     No current facility-administered medications for this visit.        Patient has no known allergies.    Past Medical History:   Diagnosis Date   • Breast abrasion    • Diabetes mellitus (CMS/HCC)    • History of kidney stones    • History of lumpectomy    • History of tonsillectomy     • Hypertension    • Pacemaker     Medtronic       Social History     Socioeconomic History   • Marital status: Unknown     Spouse name: Not on file   • Number of children: Not on file   • Years of education: Not on file   • Highest education level: Not on file   Tobacco Use   • Smoking status: Never Smoker   • Smokeless tobacco: Never Used   Substance and Sexual Activity   • Alcohol use: Yes     Comment: occasionally   • Drug use: No       Family History   Problem Relation Age of Onset   • Heart attack Mother    • Hypertension Mother    • Hyperlipidemia Mother    • Cancer Mother    • Stroke Father    • Hyperlipidemia Father    • Hypertension Father    • Cancer Father    • Heart disease Sister    • Diabetes Sister    • Stroke Sister    • Heart disease Brother    • Diabetes Brother    • Stroke Brother    • Stroke Maternal Grandmother    • Heart attack Maternal Grandfather    • Heart attack Paternal Grandmother    • Stroke Paternal Grandmother    • Heart attack Paternal Grandfather    • Stroke Paternal Grandfather        ROS     Objective     There were no vitals taken for this visit.    Physical Exam     Procedures        Assessment/Plan      There are no diagnoses linked to this encounter.    Patient's There is no height or weight on file to calculate BMI. BMI is {BMI range:72590}.       I advised Brandie of the risks of continuing to use tobacco, and I provided her with tobacco cessation educational materials in the After Visit Summary.     During this visit, I spent *** minutes counseling the patient regarding tobacco cessation.                Electronically signed by TROY Wright,  July 1, 2019 3:58 PM

## 2019-07-02 ENCOUNTER — OFFICE VISIT (OUTPATIENT)
Dept: CARDIOLOGY | Facility: CLINIC | Age: 69
End: 2019-07-02

## 2019-07-02 VITALS
BODY MASS INDEX: 35.26 KG/M2 | HEART RATE: 66 BPM | DIASTOLIC BLOOD PRESSURE: 88 MMHG | HEIGHT: 63 IN | WEIGHT: 199 LBS | SYSTOLIC BLOOD PRESSURE: 140 MMHG

## 2019-07-02 DIAGNOSIS — I10 ESSENTIAL HYPERTENSION: ICD-10-CM

## 2019-07-02 DIAGNOSIS — I49.5 SSS (SICK SINUS SYNDROME) (HCC): Primary | ICD-10-CM

## 2019-07-02 DIAGNOSIS — E11.9 TYPE 2 DIABETES MELLITUS WITHOUT COMPLICATION, WITHOUT LONG-TERM CURRENT USE OF INSULIN (HCC): ICD-10-CM

## 2019-07-02 DIAGNOSIS — R60.0 BILATERAL LEG EDEMA: ICD-10-CM

## 2019-07-02 DIAGNOSIS — E78.00 HYPERCHOLESTEREMIA: ICD-10-CM

## 2019-07-02 DIAGNOSIS — R79.89 ELEVATED TSH: ICD-10-CM

## 2019-07-02 DIAGNOSIS — R00.2 PALPITATIONS: ICD-10-CM

## 2019-07-02 DIAGNOSIS — E66.01 SEVERE OBESITY (BMI 35.0-39.9) WITH COMORBIDITY (HCC): ICD-10-CM

## 2019-07-02 DIAGNOSIS — I49.3 PVC (PREMATURE VENTRICULAR CONTRACTION): ICD-10-CM

## 2019-07-02 PROCEDURE — 99214 OFFICE O/P EST MOD 30 MIN: CPT | Performed by: NURSE PRACTITIONER

## 2019-07-02 PROCEDURE — 93000 ELECTROCARDIOGRAM COMPLETE: CPT | Performed by: NURSE PRACTITIONER

## 2019-07-02 RX ORDER — ATORVASTATIN CALCIUM 10 MG/1
10 TABLET, FILM COATED ORAL DAILY
Qty: 90 TABLET | Refills: 2 | Status: SHIPPED | OUTPATIENT
Start: 2019-07-02 | End: 2020-01-02 | Stop reason: SDUPTHER

## 2019-07-02 RX ORDER — HYDROCHLOROTHIAZIDE 25 MG/1
25 TABLET ORAL DAILY
Qty: 90 TABLET | Refills: 2 | Status: SHIPPED | OUTPATIENT
Start: 2019-07-02 | End: 2020-01-02 | Stop reason: SDUPTHER

## 2019-07-02 RX ORDER — LOSARTAN POTASSIUM 100 MG/1
100 TABLET ORAL DAILY
Qty: 90 TABLET | Refills: 2 | Status: SHIPPED | OUTPATIENT
Start: 2019-07-02 | End: 2020-01-02 | Stop reason: SDUPTHER

## 2019-07-02 RX ORDER — METOPROLOL TARTRATE 50 MG/1
50 TABLET, FILM COATED ORAL 2 TIMES DAILY
Qty: 180 TABLET | Refills: 3 | Status: SHIPPED | OUTPATIENT
Start: 2019-07-02 | End: 2020-01-02 | Stop reason: SDUPTHER

## 2019-07-02 RX ORDER — ASPIRIN 81 MG/1
81 TABLET ORAL DAILY
COMMUNITY

## 2019-07-02 NOTE — PROGRESS NOTES
Chief Complaint   Patient presents with   • Follow-up     For cardiac management. Patient is on aspirin.    • Pacemaker Check     Last MDT PPM check was done on 05/22/19 per our office.    • Med Refill       Cardiac Complaints  {NT symptoms.:86484}      Subjective   Brandie Nesbitt is a 68 y.o. female with hypertension, hyperlipidemia, palpitations, and SSS in 2014 when she had a [employer] pacemaker placed in [facility].  She established care in January 2018 after moving to [place]. She reported palpitations at consult and was noted to have atrial pacing with occasional PVCs.  Lopressor 25 mg BID was added and labs and echo ordered.  Echo showed normal LV function and only mild MR. PA pressure borderline at 31 mmHg.  Most recent pacer check in May of 2019 with our office showed 96% RA pacing with 6 years of battery life remaining and no events noted.                Cardiac History  Past Surgical History:   Procedure Laterality Date   • CARDIOVASCULAR STRESS TEST     • ECHO - CONVERTED     • ECHO - CONVERTED  01/04/2018    EF 65%. RVSP- 31 mmHg   • PACEMAKER IMPLANTATION  11/26/2014    [employer]tr[employer] @ [facility]       Current Outpatient Medications   Medication Sig Dispense Refill   • aspirin  MG tablet Take 81 mg by mouth Daily.     • atorvastatin (LIPITOR) 10 MG tablet Take 10 mg by mouth Daily.     • Cholecalciferol (VITAMIN D) 2000 units tablet Take 2,000 Units by mouth Daily.     • cyanocobalamin (CVS VITAMIN B-12) 2000 MCG tablet Take 2,000 mcg by mouth Daily.     • diltiaZEM CD (CARDIZEM CD) 120 MG 24 hr capsule Take 1 capsule by mouth Daily. 30 capsule 5   • losartan (COZAAR) 100 MG tablet TAKE 1 TABLET DAILY 90 tablet 0   • meloxicam (MOBIC) 15 MG tablet Take 15 mg by mouth Daily.     • metoprolol tartrate (LOPRESSOR) 25 MG tablet Take 1 tablet by mouth 2 (Two) Times a Day. 60 tablet 11   • metoprolol tartrate (LOPRESSOR) 25 MG tablet TAKE 1 TABLET EVERY 12     HOURS 90 tablet 0     No current  "facility-administered medications for this visit.        Patient has no known allergies.    Past Medical History:   Diagnosis Date   • Breast abrasion    • Diabetes mellitus (CMS/HCC)    • History of kidney stones    • History of lumpectomy    • History of tonsillectomy    • Hypertension    • Pacemaker     Medtronic       Social History     Socioeconomic History   • Marital status: Unknown     Spouse name: Not on file   • Number of children: Not on file   • Years of education: Not on file   • Highest education level: Not on file   Tobacco Use   • Smoking status: Never Smoker   • Smokeless tobacco: Never Used   Substance and Sexual Activity   • Alcohol use: Yes     Comment: occasionally   • Drug use: No       Family History   Problem Relation Age of Onset   • Heart attack Mother    • Hypertension Mother    • Hyperlipidemia Mother    • Cancer Mother    • Stroke Father    • Hyperlipidemia Father    • Hypertension Father    • Cancer Father    • Heart disease Sister    • Diabetes Sister    • Stroke Sister    • Heart disease Brother    • Diabetes Brother    • Stroke Brother    • Stroke Maternal Grandmother    • Heart attack Maternal Grandfather    • Heart attack Paternal Grandmother    • Stroke Paternal Grandmother    • Heart attack Paternal Grandfather    • Stroke Paternal Grandfather        ROS        Objective     Ht 160 cm (62.99\")   BMI 34.02 kg/m²     Physical Exam    Procedures    Assessment/Plan                     {Assess/Plan SmartLinks (Optional):81233}    Patient's Body mass index is 34.02 kg/m². BMI is {BMI range:90189}.      I advised Brandie of the risks of continuing to use tobacco, and I provided her with tobacco cessation educational materials in the After Visit Summary.     During this visit, I spent *** minutes counseling the patient regarding tobacco cessation.            Electronically signed by Michelle Ramos, TROY July 2, 2019 3:19 PM        "

## 2019-07-02 NOTE — PROGRESS NOTES
Chief Complaint   Patient presents with   • Follow-up     For cardiac management. Patient is on aspirin. Patient states that she has had shortness of breath, states that she feels a tightness in her neck. Does think she is having a lot of PVC's. Is very concerned about swelling in her legs and feet. Last lab work was last week per PCP, copy in door.    • Pacemaker Check     Last MDT PPM check was done on 05/22/19 per our office.    • Med Refill     Needs refills on cardiac medications. 90 day supplies to [facility].       Cardiac Complaints  dyspnea, lower extremity edema and palpitations      Subjective   Brandie Nesbitt is a 68 y.o. female with hypertension, hyperlipidemia, PVCs, and SSS diagnosed in 2014 when she had a [employer] pacer placed in [place]. She established care in January 2018 after moving to [place]. She reported palpitations at consult and was noted to have atrial pacing with occasional PVCs.  Lopressor 25 mg BID was added and labs and echo were ordered.  Echo showed normal LV function and only mild MR. PA pressure borderline at 31 mmHg. Most recent MDT pacer check in May of 2019 showed normal function, 6 years of battery life remaining, and over 96% atrial pacing.  She returns today for follow up and reports more PVCs.  She states it feels like a skip in her chest and then a tightness that will develop in her neck.  Patient reports that this happens several times a day and seems to be associated with HTN often, as she states her blood pressure is most often not at goal. Patient also reports some issues with edema in her legs and feet and states this is most often when she is sedentary and traveling in her car.  She states her legs will feel tight at the end of the day.  She also reports more stress in her life as she is worried about her 's upcoming surgery for cancer.  Labs she admits are followed by PCP.  Most recent obtained from June 2019 show:  H/H 14.1/41.8, BUN 23, creatinine 0.9,  Na 140, Ca 9.6, AST 31, ALT 36, GFR 65, TRIG 207, HDL 38, LDL 72, AIC 7.2%, and TSH 5.81.  Refills of cardiac meds requested for 90 day supply.         Cardiac History  Past Surgical History:   Procedure Laterality Date   • CARDIOVASCULAR STRESS TEST     • ECHO - CONVERTED     • ECHO - CONVERTED  01/04/2018    EF 65%. RVSP- 31 mmHg   • PACEMAKER IMPLANTATION  11/26/2014    Medtr[employer] @ [facility]       Current Outpatient Medications   Medication Sig Dispense Refill   • aspirin 81 MG EC tablet Take 81 mg by mouth Daily.     • atorvastatin (LIPITOR) 10 MG tablet Take 1 tablet by mouth Daily. 90 tablet 2   • Cholecalciferol (VITAMIN D) 2000 units tablet Take 2,000 Units by mouth Daily.     • cyanocobalamin ([employer]S VITAMIN B-12) 2000 MCG tablet Take 2,000 mcg by mouth Daily.     • losartan (COZAAR) 100 MG tablet Take 1 tablet by mouth Daily. 90 tablet 2   • meloxicam (MOBIC) 15 MG tablet Take 15 mg by mouth Daily.     • hydrochlorothiazide (HYDRODIURIL) 25 MG tablet Take 1 tablet by mouth Daily. 90 tablet 2   • metoprolol tartrate (LOPRESSOR) 50 MG tablet Take 1 tablet by mouth 2 (Two) Times a Day. 180 tablet 3     No current facility-administered medications for this visit.        Patient has no known allergies.    Past Medical History:   Diagnosis Date   • Breast abrasion    • Diabetes mellitus (CMS/HCC)    • History of kidney stones    • History of lumpectomy    • History of tonsillectomy    • Hypertension    • Pacemaker     Medtronic       Social History     Socioeconomic History   • Marital status: Unknown     Spouse name: Not on file   • Number of children: Not on file   • Years of education: Not on file   • Highest education level: Not on file   Tobacco Use   • Smoking status: Never Smoker   • Smokeless tobacco: Never Used   Substance and Sexual Activity   • Alcohol use: Yes     Comment: occasionally   • Drug use: No       Family History   Problem Relation Age of Onset   • Heart attack Mother    • Hypertension  "Mother    • Hyperlipidemia Mother    • Cancer Mother    • Stroke Father    • Hyperlipidemia Father    • Hypertension Father    • Cancer Father    • Heart disease Sister    • Diabetes Sister    • Stroke Sister    • Heart disease Brother    • Diabetes Brother    • Stroke Brother    • Stroke Maternal Grandmother    • Heart attack Maternal Grandfather    • Heart attack Paternal Grandmother    • Stroke Paternal Grandmother    • Heart attack Paternal Grandfather    • Stroke Paternal Grandfather        Review of Systems   Constitution: Negative for weakness and malaise/fatigue.   Cardiovascular: Positive for dyspnea on exertion, leg swelling and palpitations. Negative for chest pain, claudication, irregular heartbeat, near-syncope, orthopnea and syncope.   Respiratory: Positive for shortness of breath. Negative for cough and wheezing.    Musculoskeletal: Negative for back pain, joint pain and joint swelling.   Gastrointestinal: Negative for anorexia, heartburn, nausea and vomiting.   Genitourinary: Negative for dysuria, hematuria, hesitancy and nocturia.   Neurological: Negative for dizziness, light-headedness and loss of balance.   Psychiatric/Behavioral: Negative for depression and memory loss. The patient is nervous/anxious.            Objective     /88 (BP Location: Left arm)   Pulse 66   Ht 160 cm (62.99\")   Wt 90.3 kg (199 lb)   BMI 35.26 kg/m²     Physical Exam   Constitutional: She is oriented to person, place, and time. She appears well-developed and well-nourished.   HENT:   Head: Normocephalic and atraumatic.   Eyes: EOM are normal. Pupils are equal, round, and reactive to light.   Neck: Normal range of motion. Neck supple.   Cardiovascular: Normal rate. A regularly irregular rhythm present.   Murmur heard.  Pulmonary/Chest: Effort normal and breath sounds normal.   Abdominal: Soft.   Musculoskeletal: Normal range of motion.   Neurological: She is alert and oriented to person, place, and time.   Skin: " Skin is warm and dry.   Psychiatric: She has a normal mood and affect. Her behavior is normal.         ECG 12 Lead  Date/Time: 7/2/2019 3:56 PM  Performed by: Michelle Ramos APRN  Authorized by: Michelle Ramos APRN   Comparison: compared with previous ECG from 12/10/2018  Rhythm: paced  Ectopy: unifocal PVCs  BPM: 66    Clinical impression: abnormal EKG            Assessment/Plan     HR is stable but rhythm is regularly irregular.  EKG done today for SSS and PVCs shows an atrial paced rhythm with 2 PVCs.  She will be urged to increase metoprolol therapy to 50mg BID for management of palpitation and PVCs.  Blood pressure is not at goal at 140/88 and patient admits at home it is often elevated.  The increase of beta blocker therapy should help with HTN management.  She was urged to keep log at home with the change. All other medication will remain the same.  HCTZ therapy will be added as an adjunct therapy to be used as needed for edema management as she does report noticeable edema after riding in the car for prolonged periods of time.  Labs obtained from your office showed lipids at goal on current lipitor therapy.  No adjustment advised for hyperlipidemia management.  Patient did seem surprised to hear both her AIC and TSH were elevated on most recent labs.  After calling and discussing lab work with you, it appears this had been discussed in detail, including a good diabetic diet which was also included in today's plan, and a plan to reassess TSH at her next follow up.  After discussion with you today, metformin will be added per your office at 500mg BID for diabetes management.  BMI noted at 35.26, long discussion with patient about importance of weight control with diabetes and limiting her caloric and carbohydrate intake.  She reports she does have a book on carb counting she will try to adhere to.  6 month follow up advised or sooner if needed.  MDT check to be scheduled after next follow  up.        Problems Addressed this Visit        Cardiovascular and Mediastinum    SSS (sick sinus syndrome) (CMS/HCC) - Primary    Relevant Medications    metoprolol tartrate (LOPRESSOR) 50 MG tablet    Other Relevant Orders    ECG 12 Lead    Essential hypertension    Relevant Medications    metoprolol tartrate (LOPRESSOR) 50 MG tablet    losartan (COZAAR) 100 MG tablet    hydrochlorothiazide (HYDRODIURIL) 25 MG tablet    Hypercholesteremia    Relevant Medications    atorvastatin (LIPITOR) 10 MG tablet    Palpitations    PVC (premature ventricular contraction)    Relevant Medications    metoprolol tartrate (LOPRESSOR) 50 MG tablet    Other Relevant Orders    ECG 12 Lead      Other Visit Diagnoses     Bilateral leg edema        Type 2 diabetes mellitus without complication, without long-term current use of insulin (CMS/HCC)        Elevated TSH        Severe obesity (BMI 35.0-39.9) with comorbidity (CMS/HCC)              Patient's Body mass index is 35.26 kg/m². BMI is above normal parameters. Recommendations include: nutrition counseling.          Electronically signed by TROY Moncada July 5, 2019 10:35 AM

## 2019-07-02 NOTE — PATIENT INSTRUCTIONS

## 2020-01-02 ENCOUNTER — OFFICE VISIT (OUTPATIENT)
Dept: CARDIOLOGY | Facility: CLINIC | Age: 70
End: 2020-01-02

## 2020-01-02 VITALS
HEIGHT: 63 IN | SYSTOLIC BLOOD PRESSURE: 142 MMHG | BODY MASS INDEX: 34.8 KG/M2 | HEART RATE: 69 BPM | DIASTOLIC BLOOD PRESSURE: 80 MMHG | WEIGHT: 196.4 LBS

## 2020-01-02 DIAGNOSIS — R42 DIZZINESS: ICD-10-CM

## 2020-01-02 DIAGNOSIS — R07.89 CHEST TIGHTNESS: ICD-10-CM

## 2020-01-02 DIAGNOSIS — I49.5 SSS (SICK SINUS SYNDROME) (HCC): ICD-10-CM

## 2020-01-02 DIAGNOSIS — R00.2 PALPITATIONS: ICD-10-CM

## 2020-01-02 DIAGNOSIS — I99.8 SILENT ISCHEMIA: ICD-10-CM

## 2020-01-02 DIAGNOSIS — I49.3 PVC (PREMATURE VENTRICULAR CONTRACTION): ICD-10-CM

## 2020-01-02 DIAGNOSIS — I10 ESSENTIAL HYPERTENSION: Primary | ICD-10-CM

## 2020-01-02 DIAGNOSIS — E11.9 TYPE 2 DIABETES MELLITUS WITHOUT COMPLICATION, WITHOUT LONG-TERM CURRENT USE OF INSULIN (HCC): ICD-10-CM

## 2020-01-02 DIAGNOSIS — E78.00 HYPERCHOLESTEREMIA: ICD-10-CM

## 2020-01-02 PROCEDURE — 99214 OFFICE O/P EST MOD 30 MIN: CPT | Performed by: NURSE PRACTITIONER

## 2020-01-02 PROCEDURE — 93000 ELECTROCARDIOGRAM COMPLETE: CPT | Performed by: NURSE PRACTITIONER

## 2020-01-02 RX ORDER — LOSARTAN POTASSIUM 100 MG/1
100 TABLET ORAL DAILY
Qty: 90 TABLET | Refills: 2 | Status: SHIPPED | OUTPATIENT
Start: 2020-01-02 | End: 2020-05-01

## 2020-01-02 RX ORDER — ATORVASTATIN CALCIUM 10 MG/1
10 TABLET, FILM COATED ORAL DAILY
Qty: 90 TABLET | Refills: 2 | Status: SHIPPED | OUTPATIENT
Start: 2020-01-02 | End: 2020-11-11 | Stop reason: SDUPTHER

## 2020-01-02 RX ORDER — METOPROLOL TARTRATE 50 MG/1
50 TABLET, FILM COATED ORAL 2 TIMES DAILY
Qty: 180 TABLET | Refills: 3 | Status: SHIPPED | OUTPATIENT
Start: 2020-01-02 | End: 2020-11-11 | Stop reason: SDUPTHER

## 2020-01-02 RX ORDER — GLIPIZIDE 5 MG/1
5 TABLET, FILM COATED, EXTENDED RELEASE ORAL DAILY
COMMUNITY
End: 2021-05-11

## 2020-01-02 RX ORDER — HYDROCHLOROTHIAZIDE 25 MG/1
25 TABLET ORAL DAILY
Qty: 90 TABLET | Refills: 2 | Status: SHIPPED | OUTPATIENT
Start: 2020-01-02 | End: 2020-10-28

## 2020-01-02 NOTE — PROGRESS NOTES
"Chief Complaint   Patient presents with   • Follow-up     Cardiac managment.   • Lab     Last labs a month ago per PCP.   • Pacemaker Check     Last Medtronic PPM check 5/22/19.   • Shortness of Breath     Has with exertion. She reports having occasional PVC's.   • Med Refill     Needs refills on cardiac medication -90 day. Patient went over medications verbally.       Subjective       Brandie Nesbitt is a 69 y.o. female with hypertension, hyperlipidemia, PVCs, and SSS diagnosed in 2014 when she had a Medtronic pacer placed in [place]. Stress test at that time was apparently inconclusive as she failed to reach THR. Data deficient. She established care in January 2018 after moving to [place]. She reported palpitations at consult and was noted to have atrial pacing with occasional PVCs. Lopressor 25 mg BID added and labs/echo ordered.  Echo showed normal LV function and only mild MR. PA pressure borderline at 31 mmHg. Most recent device check in May 2019 showed 96% atrial paced, 6 years battery life remaining and approximately 11-13% PVC. Labs on 6/5/2019 showed , , LDL 72, HDL 38, A1C 7.2%, TSH 5.81, CBC normal, glucose 147, BUN/CR 23/0.9, GFR 65.    She came today for follow up. She continues to have palpitations she describes as a flutter and tightness in her throat. Palpitations occur daily, but \"she is used to it\". She is walking 3 miles/day, 4 days per week at [facility] and does notice increasing shortness of breath, fatigue and tightness as she is completing her walk. Palpitations are present at rest as well as with exertion. Blood pressure remains stable but upper limit of normal with systolic 140. Labs checked one month ago with PCP showed A1C improved to 5.8%. She has lost 3 pounds. Refills requested.     HPI         Cardiac History:    Past Surgical History:   Procedure Laterality Date   • CARDIOVASCULAR STRESS TEST     • ECHO - CONVERTED     • ECHO - CONVERTED  01/04/2018    EF 65%. RVSP- 31 " mmHg   • PACEMAKER IMPLANTATION  11/26/2014    [employer] @ [facility]       Current Outpatient Medications   Medication Sig Dispense Refill   • aspirin 81 MG EC tablet Take 81 mg by mouth Daily.     • atorvastatin (LIPITOR) 10 MG tablet Take 1 tablet by mouth Daily. 90 tablet 2   • Cholecalciferol (VITAMIN D) 2000 units tablet Take 2,000 Units by mouth Daily.     • cyanocobalamin (CVS VITAMIN B-12) 2000 MCG tablet Take 2,000 mcg by mouth Daily.     • glipizide (GLUCOTROL XL) 5 MG ER tablet Take 5 mg by mouth Daily.     • hydroCHLOROthiazide (HYDRODIURIL) 25 MG tablet Take 1 tablet by mouth Daily. 90 tablet 2   • losartan (COZAAR) 100 MG tablet Take 1 tablet by mouth Daily. 90 tablet 2   • meloxicam (MOBIC) 15 MG tablet Take 15 mg by mouth Daily.     • metoprolol tartrate (LOPRESSOR) 50 MG tablet Take 1 tablet by mouth 2 (Two) Times a Day. 180 tablet 3     No current facility-administered medications for this visit.        Patient has no known allergies.    Past Medical History:   Diagnosis Date   • Breast abrasion    • Diabetes mellitus (CMS/HCC)    • History of kidney stones    • History of lumpectomy    • History of tonsillectomy    • Hypertension    • Pacemaker     Medtronic       Social History     Socioeconomic History   • Marital status: Unknown     Spouse name: Not on file   • Number of children: Not on file   • Years of education: Not on file   • Highest education level: Not on file   Tobacco Use   • Smoking status: Never Smoker   • Smokeless tobacco: Never Used   Substance and Sexual Activity   • Alcohol use: Yes     Comment: occasionally   • Drug use: No       Family History   Problem Relation Age of Onset   • Heart attack Mother    • Hypertension Mother    • Hyperlipidemia Mother    • Cancer Mother    • Stroke Father    • Hyperlipidemia Father    • Hypertension Father    • Cancer Father    • Heart disease Sister    • Diabetes Sister    • Stroke Sister    • Heart disease Brother    • Diabetes Brother   "  • Stroke Brother    • Stroke Maternal Grandmother    • Heart attack Maternal Grandfather    • Heart attack Paternal Grandmother    • Stroke Paternal Grandmother    • Heart attack Paternal Grandfather    • Stroke Paternal Grandfather        Review of Systems   Constitution: Positive for malaise/fatigue. Negative for decreased appetite.   HENT: Negative.    Eyes: Negative.    Cardiovascular: Positive for dyspnea on exertion (mild) and palpitations. Negative for chest pain (tightness with palpitations), leg swelling, orthopnea and syncope.   Respiratory: Positive for shortness of breath. Negative for cough.    Endocrine: Negative.    Hematologic/Lymphatic: Negative.    Skin: Negative.    Musculoskeletal: Negative.    Gastrointestinal: Negative for abdominal pain and melena.   Genitourinary: Negative for dysuria and hematuria.   Neurological: Negative for dizziness.   Psychiatric/Behavioral: Negative for altered mental status and depression.   Allergic/Immunologic: Negative.       Diabetes- Yes, improved   Thyroid-normal    Objective     /80 (BP Location: Right arm)   Pulse 69   Ht 160 cm (62.99\")   Wt 89.1 kg (196 lb 6.4 oz)   BMI 34.80 kg/m²     Physical Exam   Constitutional: She is oriented to person, place, and time. She appears well-developed and well-nourished. No distress.   HENT:   Head: Normocephalic.   Eyes: Pupils are equal, round, and reactive to light.   Neck: Normal range of motion. Carotid bruit is present.   Cardiovascular: Normal rate, regular rhythm and intact distal pulses.   Murmur heard.  Pulmonary/Chest: Effort normal and breath sounds normal.   Abdominal: Soft. Bowel sounds are normal.   Musculoskeletal: Normal range of motion. She exhibits no edema.   Neurological: She is alert and oriented to person, place, and time.   Skin: Skin is warm and dry. She is not diaphoretic.   Psychiatric: She has a normal mood and affect.   Nursing note and vitals reviewed.       ECG 12 Lead  Date/Time: " 1/2/2020 4:19 PM  Performed by: Alisa Morse APRN  Authorized by: Alisa Morse APRN   Comparison: compared with previous ECG from 7/2/2019  Similar to previous ECG  Rhythm: paced  Ectopy: unifocal PVCs  Rate: normal  BPM: 69    Clinical impression: non-specific ECG  Comments: Atrial paced  2 PVCs                Assessment/Plan      Brandie was seen today for follow-up, lab, pacemaker check, shortness of breath and med refill.    Diagnoses and all orders for this visit:    Essential hypertension  -     Stress Test With Myocardial Perfusion One Day; Future  -     Adult Transthoracic Echo Complete W/ Cont if Necessary Per Protocol; Future  -     metoprolol tartrate (LOPRESSOR) 50 MG tablet; Take 1 tablet by mouth 2 (Two) Times a Day.  -     hydroCHLOROthiazide (HYDRODIURIL) 25 MG tablet; Take 1 tablet by mouth Daily.  -     US Carotid Bilateral; Future    Hypercholesteremia  -     Stress Test With Myocardial Perfusion One Day; Future  -     Adult Transthoracic Echo Complete W/ Cont if Necessary Per Protocol; Future  -     atorvastatin (LIPITOR) 10 MG tablet; Take 1 tablet by mouth Daily.  -     US Carotid Bilateral; Future    Palpitations  -     Stress Test With Myocardial Perfusion One Day; Future  -     Adult Transthoracic Echo Complete W/ Cont if Necessary Per Protocol; Future    PVC (premature ventricular contraction)  -     Stress Test With Myocardial Perfusion One Day; Future  -     Adult Transthoracic Echo Complete W/ Cont if Necessary Per Protocol; Future  -     metoprolol tartrate (LOPRESSOR) 50 MG tablet; Take 1 tablet by mouth 2 (Two) Times a Day.    SSS (sick sinus syndrome) (CMS/HCC)  -     Stress Test With Myocardial Perfusion One Day; Future  -     Adult Transthoracic Echo Complete W/ Cont if Necessary Per Protocol; Future    Type 2 diabetes mellitus without complication, without long-term current use of insulin (CMS/HCC)  -     Stress Test With Myocardial Perfusion One Day; Future  -     Adult  Transthoracic Echo Complete W/ Cont if Necessary Per Protocol; Future    Silent ischemia  -     Stress Test With Myocardial Perfusion One Day; Future    Chest tightness  -     Stress Test With Myocardial Perfusion One Day; Future    Dizziness  -     US Carotid Bilateral; Future    Other orders  -     losartan (COZAAR) 100 MG tablet; Take 1 tablet by mouth Daily.  -     ECG 12 Lead    Heart rate and blood pressure stable.  Continue losartan, metoprolol, HCTZ.  EKG for SSS s/p PPM showed atrial pacing, ventricular sensing, 2 PVCs.  We reviewed her last device interrogation showing 13% PVCs.  She will be scheduled for [employer] pacemaker check for routine surveillance.  To evaluate shortness of breath, chest tightness associated with exertion, and PVC response to exercise, she is advised to undergo nuclear stress testing to rule out stress-induced arrhythmia or silent ischemia in a diabetic.  If no ischemia is noted and palpitations worsen, she may be a candidate for flecainide therapy for frequent PVC.  We will also check carotid ultrasound as very faint carotid bruit is noted on the right and she has very strong family history of CVA and carotid artery disease.  Lipids are managed with atorvastatin 10 mg with LDL at goal at 72 on previous labs.  Labs have been rechecked, copy not available.  Refill sent for her cardiac meds.  She was congratulated on her weight loss and improvement in diabetic status from 7.2 to 5.8%.  Further recommendations to follow echo, stress, carotid ultrasound and device interrogation.  We will see her back in 6 months for follow-up.    Patient's Body mass index is 34.8 kg/m². BMI is above normal parameters. Recommendations include: nutrition counseling.                        Electronically signed by TROY Bonilla,  January 2, 2020 4:22 PM

## 2020-01-08 ENCOUNTER — HOSPITAL ENCOUNTER (OUTPATIENT)
Dept: CARDIOLOGY | Facility: HOSPITAL | Age: 70
Discharge: HOME | End: 2020-01-08

## 2020-01-08 DIAGNOSIS — E78.00 HYPERCHOLESTEREMIA: ICD-10-CM

## 2020-01-08 DIAGNOSIS — R00.2 PALPITATIONS: ICD-10-CM

## 2020-01-08 DIAGNOSIS — I99.8 SILENT ISCHEMIA: ICD-10-CM

## 2020-01-08 DIAGNOSIS — I49.5 SSS (SICK SINUS SYNDROME) (HCC): ICD-10-CM

## 2020-01-08 DIAGNOSIS — R07.89 CHEST TIGHTNESS: ICD-10-CM

## 2020-01-08 DIAGNOSIS — E11.9 TYPE 2 DIABETES MELLITUS WITHOUT COMPLICATION, WITHOUT LONG-TERM CURRENT USE OF INSULIN (HCC): ICD-10-CM

## 2020-01-08 DIAGNOSIS — I10 ESSENTIAL HYPERTENSION: ICD-10-CM

## 2020-01-08 DIAGNOSIS — I49.3 PVC (PREMATURE VENTRICULAR CONTRACTION): ICD-10-CM

## 2020-01-08 DIAGNOSIS — R42 DIZZINESS: ICD-10-CM

## 2020-01-08 PROCEDURE — 0 TECHNETIUM SESTAMIBI: Performed by: INTERNAL MEDICINE

## 2020-01-08 PROCEDURE — 93880 EXTRACRANIAL BILAT STUDY: CPT

## 2020-01-08 PROCEDURE — A9500 TC99M SESTAMIBI: HCPCS | Performed by: INTERNAL MEDICINE

## 2020-01-08 PROCEDURE — 93017 CV STRESS TEST TRACING ONLY: CPT

## 2020-01-08 PROCEDURE — 93306 TTE W/DOPPLER COMPLETE: CPT

## 2020-01-08 PROCEDURE — 78452 HT MUSCLE IMAGE SPECT MULT: CPT

## 2020-01-08 PROCEDURE — 93306 TTE W/DOPPLER COMPLETE: CPT | Performed by: INTERNAL MEDICINE

## 2020-01-08 PROCEDURE — 78452 HT MUSCLE IMAGE SPECT MULT: CPT | Performed by: INTERNAL MEDICINE

## 2020-01-08 PROCEDURE — 93018 CV STRESS TEST I&R ONLY: CPT | Performed by: INTERNAL MEDICINE

## 2020-01-08 PROCEDURE — 93016 CV STRESS TEST SUPVJ ONLY: CPT | Performed by: NURSE PRACTITIONER

## 2020-01-08 PROCEDURE — 93880 EXTRACRANIAL BILAT STUDY: CPT | Performed by: RADIOLOGY

## 2020-01-08 RX ADMIN — TECHNETIUM TC 99M SESTAMIBI 1 DOSE: 1 INJECTION INTRAVENOUS at 07:42

## 2020-01-08 RX ADMIN — TECHNETIUM TC 99M SESTAMIBI 1 DOSE: 1 INJECTION INTRAVENOUS at 07:43

## 2020-01-09 LAB
BH CV ECHO MEAS - ACS: 1.9 CM
BH CV ECHO MEAS - AO MAX PG: 6.9 MMHG
BH CV ECHO MEAS - AO MEAN PG: 4 MMHG
BH CV ECHO MEAS - AO ROOT AREA (BSA CORRECTED): 1.4
BH CV ECHO MEAS - AO ROOT AREA: 5.3 CM^2
BH CV ECHO MEAS - AO ROOT DIAM: 2.6 CM
BH CV ECHO MEAS - AO V2 MAX: 131 CM/SEC
BH CV ECHO MEAS - AO V2 MEAN: 87 CM/SEC
BH CV ECHO MEAS - AO V2 VTI: 29.5 CM
BH CV ECHO MEAS - BSA(HAYCOCK): 2 M^2
BH CV ECHO MEAS - BSA: 1.9 M^2
BH CV ECHO MEAS - BZI_BMI: 35.8 KILOGRAMS/M^2
BH CV ECHO MEAS - BZI_METRIC_HEIGHT: 157.5 CM
BH CV ECHO MEAS - BZI_METRIC_WEIGHT: 88.9 KG
BH CV ECHO MEAS - EDV(CUBED): 73.6 ML
BH CV ECHO MEAS - EDV(MOD-SP4): 93.4 ML
BH CV ECHO MEAS - EDV(TEICH): 78.1 ML
BH CV ECHO MEAS - EF(CUBED): 65.5 %
BH CV ECHO MEAS - EF(MOD-SP4): 50.6 %
BH CV ECHO MEAS - EF(TEICH): 57.4 %
BH CV ECHO MEAS - ESV(CUBED): 25.4 ML
BH CV ECHO MEAS - ESV(MOD-SP4): 46.1 ML
BH CV ECHO MEAS - ESV(TEICH): 33.3 ML
BH CV ECHO MEAS - FS: 29.8 %
BH CV ECHO MEAS - IVS/LVPW: 1
BH CV ECHO MEAS - IVSD: 1.3 CM
BH CV ECHO MEAS - LA DIMENSION: 3.9 CM
BH CV ECHO MEAS - LA/AO: 1.5
BH CV ECHO MEAS - LV DIASTOLIC VOL/BSA (35-75): 49.3 ML/M^2
BH CV ECHO MEAS - LV IVRT: 0.1 SEC
BH CV ECHO MEAS - LV MASS(C)D: 186.3 GRAMS
BH CV ECHO MEAS - LV MASS(C)DI: 98.3 GRAMS/M^2
BH CV ECHO MEAS - LV SYSTOLIC VOL/BSA (12-30): 24.3 ML/M^2
BH CV ECHO MEAS - LVIDD: 4.2 CM
BH CV ECHO MEAS - LVIDS: 2.9 CM
BH CV ECHO MEAS - LVLD AP4: 7.2 CM
BH CV ECHO MEAS - LVLS AP4: 6.6 CM
BH CV ECHO MEAS - LVOT AREA (M): 3.1 CM^2
BH CV ECHO MEAS - LVOT AREA: 3.1 CM^2
BH CV ECHO MEAS - LVOT DIAM: 2 CM
BH CV ECHO MEAS - LVPWD: 1.2 CM
BH CV ECHO MEAS - MV A MAX VEL: 66.8 CM/SEC
BH CV ECHO MEAS - MV DEC SLOPE: 277 CM/SEC^2
BH CV ECHO MEAS - MV E MAX VEL: 82.3 CM/SEC
BH CV ECHO MEAS - MV E/A: 1.2
BH CV ECHO MEAS - RAP SYSTOLE: 10 MMHG
BH CV ECHO MEAS - RVDD: 3.1 CM
BH CV ECHO MEAS - RVSP: 33.8 MMHG
BH CV ECHO MEAS - SI(AO): 82.6 ML/M^2
BH CV ECHO MEAS - SI(CUBED): 25.4 ML/M^2
BH CV ECHO MEAS - SI(MOD-SP4): 25 ML/M^2
BH CV ECHO MEAS - SI(TEICH): 23.7 ML/M^2
BH CV ECHO MEAS - SV(AO): 156.6 ML
BH CV ECHO MEAS - SV(CUBED): 48.1 ML
BH CV ECHO MEAS - SV(MOD-SP4): 47.3 ML
BH CV ECHO MEAS - SV(TEICH): 44.8 ML
BH CV ECHO MEAS - TR MAX VEL: 244 CM/SEC
BH CV STRESS RECOVERY BP: NORMAL MMHG
BH CV STRESS RECOVERY HR: 98 BPM
LV EF NUC BP: 71 %
MAXIMAL PREDICTED HEART RATE: 153 BPM
PERCENT MAX PREDICTED HR: 88.89 %
STRESS BASELINE BP: 116 MMHG
STRESS BASELINE HR: 61 BPM
STRESS O2 SAT REST: 64 %
STRESS PERCENT HR: 105 %
STRESS POST ESTIMATED WORKLOAD: 7 METS
STRESS POST EXERCISE DUR MIN: 7 MIN
STRESS POST EXERCISE DUR SEC: 0 SEC
STRESS POST PEAK BP: NORMAL MMHG
STRESS POST PEAK HR: 136 BPM
STRESS TARGET HR: 130 BPM

## 2020-02-26 ENCOUNTER — OFFICE VISIT (OUTPATIENT)
Dept: CARDIOLOGY | Facility: CLINIC | Age: 70
End: 2020-02-26

## 2020-02-26 DIAGNOSIS — I49.5 SSS (SICK SINUS SYNDROME) (HCC): Primary | ICD-10-CM

## 2020-02-26 DIAGNOSIS — R00.2 PALPITATIONS: ICD-10-CM

## 2020-02-26 PROCEDURE — 93288 INTERROG EVL PM/LDLS PM IP: CPT | Performed by: INTERNAL MEDICINE

## 2020-05-01 RX ORDER — LOSARTAN POTASSIUM 100 MG/1
TABLET ORAL
Qty: 90 TABLET | Refills: 0 | Status: SHIPPED | OUTPATIENT
Start: 2020-05-01 | End: 2020-07-20

## 2020-07-20 RX ORDER — LOSARTAN POTASSIUM 100 MG/1
TABLET ORAL
Qty: 30 TABLET | Refills: 0 | Status: SHIPPED | OUTPATIENT
Start: 2020-07-20 | End: 2020-08-11

## 2020-08-11 RX ORDER — LOSARTAN POTASSIUM 100 MG/1
TABLET ORAL
Qty: 30 TABLET | Refills: 0 | Status: SHIPPED | OUTPATIENT
Start: 2020-08-11 | End: 2020-11-11 | Stop reason: SDUPTHER

## 2020-09-08 RX ORDER — LOSARTAN POTASSIUM 100 MG/1
TABLET ORAL
Qty: 30 TABLET | Refills: 0 | OUTPATIENT
Start: 2020-09-08

## 2020-10-27 DIAGNOSIS — I10 ESSENTIAL HYPERTENSION: ICD-10-CM

## 2020-10-28 RX ORDER — HYDROCHLOROTHIAZIDE 25 MG/1
TABLET ORAL
Qty: 90 TABLET | Refills: 0 | Status: SHIPPED | OUTPATIENT
Start: 2020-10-28 | End: 2020-11-11 | Stop reason: SDUPTHER

## 2020-11-11 ENCOUNTER — OFFICE VISIT (OUTPATIENT)
Dept: CARDIOLOGY | Facility: CLINIC | Age: 70
End: 2020-11-11

## 2020-11-11 VITALS
TEMPERATURE: 97.8 F | SYSTOLIC BLOOD PRESSURE: 122 MMHG | WEIGHT: 198.7 LBS | HEART RATE: 68 BPM | BODY MASS INDEX: 35.21 KG/M2 | HEIGHT: 63 IN | DIASTOLIC BLOOD PRESSURE: 80 MMHG

## 2020-11-11 DIAGNOSIS — E78.00 HYPERCHOLESTEREMIA: ICD-10-CM

## 2020-11-11 DIAGNOSIS — I10 ESSENTIAL HYPERTENSION: ICD-10-CM

## 2020-11-11 DIAGNOSIS — E11.9 TYPE 2 DIABETES MELLITUS WITHOUT COMPLICATION, WITHOUT LONG-TERM CURRENT USE OF INSULIN (HCC): ICD-10-CM

## 2020-11-11 DIAGNOSIS — I49.5 SSS (SICK SINUS SYNDROME) (HCC): Primary | ICD-10-CM

## 2020-11-11 DIAGNOSIS — I34.0 NONRHEUMATIC MITRAL VALVE REGURGITATION: ICD-10-CM

## 2020-11-11 DIAGNOSIS — I49.3 PVC (PREMATURE VENTRICULAR CONTRACTION): ICD-10-CM

## 2020-11-11 PROCEDURE — 93000 ELECTROCARDIOGRAM COMPLETE: CPT | Performed by: NURSE PRACTITIONER

## 2020-11-11 PROCEDURE — 99214 OFFICE O/P EST MOD 30 MIN: CPT | Performed by: NURSE PRACTITIONER

## 2020-11-11 RX ORDER — ATORVASTATIN CALCIUM 10 MG/1
10 TABLET, FILM COATED ORAL DAILY
Qty: 90 TABLET | Refills: 2 | Status: SHIPPED | OUTPATIENT
Start: 2020-11-11 | End: 2021-05-11 | Stop reason: SDUPTHER

## 2020-11-11 RX ORDER — HYDROCHLOROTHIAZIDE 25 MG/1
25 TABLET ORAL DAILY
Qty: 90 TABLET | Refills: 3 | Status: SHIPPED | OUTPATIENT
Start: 2020-11-11 | End: 2021-05-11 | Stop reason: SDUPTHER

## 2020-11-11 RX ORDER — LOSARTAN POTASSIUM 100 MG/1
100 TABLET ORAL DAILY
Qty: 90 TABLET | Refills: 3 | Status: SHIPPED | OUTPATIENT
Start: 2020-11-11 | End: 2021-05-11 | Stop reason: SDUPTHER

## 2020-11-11 RX ORDER — METOPROLOL TARTRATE 50 MG/1
50 TABLET, FILM COATED ORAL 2 TIMES DAILY
Qty: 180 TABLET | Refills: 3 | Status: SHIPPED | OUTPATIENT
Start: 2020-11-11 | End: 2021-05-11 | Stop reason: SDUPTHER

## 2020-11-11 NOTE — PROGRESS NOTES
Chief Complaint   Patient presents with   • Follow-up     Cardiac management.   • Lab     Last labs with PCP this year.   • Pacemaker Check     Last Medtronic PPM interrogation 2/28/20.   • Chest Pain     Occasional ache to left upper chest, short in duration.    • Nausea     Has every night when she lays down.   • Shortness of Breath     Notices when has PVCs.   • Med Refill     Needs refills on cardiac medications-90 day, patient went over medications verbally.     Subjective       Brandie Nesbitt is a 69 y.o. female with hypertension, hyperlipidemia, PVCs and SSS as diagnosed in 2014 when she had a Medtronic pacemaker placed in [place].She established care in January 2018 after moving to [place]. She reported palpitations at consult and was noted to have atrial pacing with occasional PVCs. Lopressor 25 mg BID added.  Echo appears stable.  In January 2020, she reported increasing dyspnea on exertion.  Stress test and echocardiogram were ordered which showed normal LV function and no evidence of ischemia.  Carotid ultrasound showed 50% on the left and no hemodynamically significant stenosis on the right.  LDL has been well controlled at 72 with Lipitor.  Last A1c 7.2%. Device check 2/26/2020 showed 100% RA paced with 4.5 years battery life.    She returns today for regular follow-up visit.  She denies any new or worsening symptoms.  She continues to have occasional palpitations and does associate mild dyspnea when this occurs.  She remains active but no longer walking at [facility] due to Covid.  Labs have been rechecked with PCP most recent not available.  She dealing with the recent loss of her sister.        Cardiac History:    Past Surgical History:   Procedure Laterality Date   • CARDIOVASCULAR STRESS TEST  01/08/2020    7 Min. 7. METS. 89% THR. BP- 158/84. EF > 70%. Breast Attenuation.   • ECHO - CONVERTED  01/04/2018    EF 65%. RVSP- 31 mmHg   • ECHO - CONVERTED  01/08/2020    EF 60%. LA- 3.9 Cm. Mild- Mod  MRJaden RVSP- 34 mmHg.   • PACEMAKER IMPLANTATION  11/26/2014    Medtronic @ [facility]       Current Outpatient Medications   Medication Sig Dispense Refill   • aspirin 81 MG EC tablet Take 81 mg by mouth Daily.     • atorvastatin (LIPITOR) 10 MG tablet Take 1 tablet by mouth Daily. 90 tablet 2   • Cholecalciferol (VITAMIN D) 2000 units tablet Take 2,000 Units by mouth Daily.     • cyanocobalamin (CVS VITAMIN B-12) 2000 MCG tablet Take 2,000 mcg by mouth Daily.     • glipizide (GLUCOTROL XL) 5 MG ER tablet Take 5 mg by mouth Daily.     • hydroCHLOROthiazide (HYDRODIURIL) 25 MG tablet Take 1 tablet by mouth Daily. 90 tablet 3   • losartan (COZAAR) 100 MG tablet Take 1 tablet by mouth Daily. 90 tablet 3   • meloxicam (MOBIC) 15 MG tablet Take 15 mg by mouth Daily.     • metoprolol tartrate (LOPRESSOR) 50 MG tablet Take 1 tablet by mouth 2 (Two) Times a Day. 180 tablet 3     No current facility-administered medications for this visit.      Patient has no known allergies.    Past Medical History:   Diagnosis Date   • Breast abrasion    • Diabetes mellitus (CMS/HCC)    • History of kidney stones    • History of lumpectomy    • History of tonsillectomy    • Hypertension    • Pacemaker     Medtronic   • Tennis elbow        Social History     Socioeconomic History   • Marital status: Unknown     Spouse name: Not on file   • Number of children: Not on file   • Years of education: Not on file   • Highest education level: Not on file   Tobacco Use   • Smoking status: Never Smoker   • Smokeless tobacco: Never Used   Substance and Sexual Activity   • Alcohol use: Yes     Comment: occasionally   • Drug use: No       Family History   Problem Relation Age of Onset   • Heart attack Mother    • Hypertension Mother    • Hyperlipidemia Mother    • Cancer Mother    • Stroke Father    • Hyperlipidemia Father    • Hypertension Father    • Cancer Father    • Heart disease Sister    • Diabetes Sister    • Stroke Sister    • Heart disease  "Brother    • Diabetes Brother    • Stroke Brother    • Stroke Maternal Grandmother    • Heart attack Maternal Grandfather    • Heart attack Paternal Grandmother    • Stroke Paternal Grandmother    • Heart attack Paternal Grandfather    • Stroke Paternal Grandfather      Review of Systems   Constitution: Negative for decreased appetite and malaise/fatigue.   HENT: Negative.    Eyes: Negative for blurred vision.   Cardiovascular: Negative for chest pain, dyspnea on exertion, leg swelling, palpitations and syncope.   Respiratory: Negative for shortness of breath and sleep disturbances due to breathing.    Endocrine: Negative.    Hematologic/Lymphatic: Negative for bleeding problem. Does not bruise/bleed easily.   Skin: Negative.    Musculoskeletal: Negative for falls and myalgias.   Gastrointestinal: Negative for abdominal pain, heartburn and melena.   Genitourinary: Negative for hematuria.   Neurological: Negative for dizziness and light-headedness.   Psychiatric/Behavioral: Negative for altered mental status.   Allergic/Immunologic: Negative.       Objective     /80 (BP Location: Left arm)   Pulse 68   Temp 97.8 °F (36.6 °C)   Ht 160 cm (62.99\")   Wt 90.1 kg (198 lb 11.2 oz)   BMI 35.21 kg/m²     Vitals signs and nursing note reviewed.   Constitutional:       General: Not in acute distress.     Appearance: Well-developed. Not diaphoretic.   Eyes:      Pupils: Pupils are equal, round, and reactive to light.   HENT:      Head: Normocephalic.   Neck:      Musculoskeletal: Normal range of motion.   Pulmonary:      Effort: Pulmonary effort is normal. No respiratory distress.      Breath sounds: Normal breath sounds.   Cardiovascular:      Normal rate. Regular rhythm.   Pulses:     Intact distal pulses.   Abdominal:      General: Bowel sounds are normal.      Palpations: Abdomen is soft.   Musculoskeletal: Normal range of motion.   Skin:     General: Skin is warm and dry.   Neurological:      Mental Status: " Alert and oriented to person, place, and time.          ECG 12 Lead    Date/Time: 11/12/2020 12:49 PM  Performed by: Alisa Morse APRN  Authorized by: Alisa Morse APRN   Comparison: compared with previous ECG   Rhythm: paced  Rate: normal  BPM: 68  ST Segments: ST segments normal    Clinical impression: non-specific ECG  Comments: Atrial paced                  Problem List Items Addressed This Visit        Cardiovascular and Mediastinum    SSS (sick sinus syndrome) (CMS/[facility]) - Primary    Relevant Medications    metoprolol tartrate (LOPRESSOR) 50 MG tablet    Essential hypertension    Relevant Medications    losartan (COZAAR) 100 MG tablet    metoprolol tartrate (LOPRESSOR) 50 MG tablet    hydroCHLOROthiazide (HYDRODIURIL) 25 MG tablet    Hypercholesteremia    Relevant Medications    atorvastatin (LIPITOR) 10 MG tablet    PVC (premature ventricular contraction)    Relevant Medications    metoprolol tartrate (LOPRESSOR) 50 MG tablet    Nonrheumatic mitral valve regurgitation    Relevant Medications    metoprolol tartrate (LOPRESSOR) 50 MG tablet       Endocrine    Type 2 diabetes mellitus, without long-term current use of insulin (CMS/[facility])         1.  HTN-well-controlled, continue losartan, Metoprolol, HCTZ.  Limit sodium.  Weight loss.    2.  Hypercholesterolemia-managed with low-dose Lipitor.  In 2019 LDL well controlled at 72.  Continue the same.    3.  Diabetes-patient reports well controlled.  Encouraged ADA diet.    4.  SSS-Medtronic pacemaker in situ.  EKG today shows atrial paced ventricular sensed at 68 bpm.  No PVCs.  We will schedule her for a device interrogation.     5.  Carotid ultrasound-mild to moderate stenosis.  We will repeat carotid ultrasound next visit for follow-up.  She is concerned as family history is strong, patient sister had a stroke.  Continue risk factor modification with good blood pressure, diabetes and lipid control.    Patient's Body mass index is 35.21 kg/m². BMI is above normal  parameters. Recommendations include: nutrition counseling.               Electronically signed by TROY Bonilla,  November 11, 2020 16:20 EST

## 2020-11-25 ENCOUNTER — OFFICE VISIT (OUTPATIENT)
Dept: CARDIOLOGY | Facility: CLINIC | Age: 70
End: 2020-11-25

## 2020-11-25 DIAGNOSIS — R00.2 PALPITATIONS: ICD-10-CM

## 2020-11-25 DIAGNOSIS — I49.5 SSS (SICK SINUS SYNDROME) (HCC): Primary | ICD-10-CM

## 2020-11-25 PROCEDURE — 93288 INTERROG EVL PM/LDLS PM IP: CPT | Performed by: INTERNAL MEDICINE

## 2020-12-16 ENCOUNTER — TELEPHONE (OUTPATIENT)
Dept: CARDIOLOGY | Facility: CLINIC | Age: 70
End: 2020-12-16

## 2021-05-11 ENCOUNTER — OFFICE VISIT (OUTPATIENT)
Dept: CARDIOLOGY | Facility: CLINIC | Age: 71
End: 2021-05-11

## 2021-05-11 VITALS
WEIGHT: 201.8 LBS | DIASTOLIC BLOOD PRESSURE: 70 MMHG | HEIGHT: 63 IN | BODY MASS INDEX: 35.75 KG/M2 | SYSTOLIC BLOOD PRESSURE: 126 MMHG | TEMPERATURE: 97.7 F | HEART RATE: 69 BPM

## 2021-05-11 DIAGNOSIS — I49.3 PVC (PREMATURE VENTRICULAR CONTRACTION): ICD-10-CM

## 2021-05-11 DIAGNOSIS — I65.22 STENOSIS OF LEFT CAROTID ARTERY: Primary | ICD-10-CM

## 2021-05-11 DIAGNOSIS — E78.00 HYPERCHOLESTEREMIA: ICD-10-CM

## 2021-05-11 DIAGNOSIS — I10 ESSENTIAL HYPERTENSION: ICD-10-CM

## 2021-05-11 DIAGNOSIS — I34.0 NONRHEUMATIC MITRAL VALVE REGURGITATION: ICD-10-CM

## 2021-05-11 DIAGNOSIS — I49.5 SSS (SICK SINUS SYNDROME) (HCC): ICD-10-CM

## 2021-05-11 PROCEDURE — 99214 OFFICE O/P EST MOD 30 MIN: CPT | Performed by: NURSE PRACTITIONER

## 2021-05-11 PROCEDURE — 93000 ELECTROCARDIOGRAM COMPLETE: CPT | Performed by: NURSE PRACTITIONER

## 2021-05-11 RX ORDER — METOPROLOL TARTRATE 50 MG/1
50 TABLET, FILM COATED ORAL 2 TIMES DAILY
Qty: 180 TABLET | Refills: 3 | Status: SHIPPED | OUTPATIENT
Start: 2021-05-11 | End: 2021-11-30 | Stop reason: SDUPTHER

## 2021-05-11 RX ORDER — LOSARTAN POTASSIUM 100 MG/1
100 TABLET ORAL DAILY
Qty: 90 TABLET | Refills: 3 | Status: SHIPPED | OUTPATIENT
Start: 2021-05-11 | End: 2021-11-30 | Stop reason: SDUPTHER

## 2021-05-11 RX ORDER — ATORVASTATIN CALCIUM 10 MG/1
10 TABLET, FILM COATED ORAL DAILY
Qty: 90 TABLET | Refills: 3 | Status: SHIPPED | OUTPATIENT
Start: 2021-05-11 | End: 2021-11-30 | Stop reason: SDUPTHER

## 2021-05-11 RX ORDER — HYDROCHLOROTHIAZIDE 25 MG/1
25 TABLET ORAL DAILY
Qty: 90 TABLET | Refills: 3 | Status: SHIPPED | OUTPATIENT
Start: 2021-05-11 | End: 2021-11-30 | Stop reason: SDUPTHER

## 2021-05-11 NOTE — PROGRESS NOTES
Chief Complaint   Patient presents with   • Follow-up     Cardiac management   • Lab     Last labs about 6 months ago per PCP.    • Pacemaker Check     Last medtronic PPM interrogation 11/25/20.   • Shortness of Breath     Notices when bending over.   • Med Refill     Needs refills on cardiac medications-90 day.     Subjective       Brandie Nesbitt is a 70 y.o. female with hypertension, hyperlipidemia, PVCs and SSS diagnosed in 2014 when she had a Medtronic pacemaker placed in [place]. She established care in January 2018 after moving to [place]. She reported palpitations at consult and was noted to have atrial pacing with occasional PVCs. Lopressor 25 mg BID added.  Echo appeared stable.  In January 2020, she reported increasing dyspnea on exertion.  Stress test and echocardiogram were ordered which showed normal LV function and no evidence of ischemia.  Carotid ultrasound showed 50% on the left and no hemodynamically significant stenosis on the right.  LDL has been well controlled at 72 with Lipitor.  Last A1c 7.2%. Device check 11/25/2020 showed 99% RA paced, 1 short episode of AT/41 seconds, 4 years battery life remaining.    She returns today for regular follow-up.  Denies chest pain, palpitations, dyspnea on exertion.  No TIA symptoms.  She has been walking 5 days a week for the last 2 months.  She watches her diet, limit sugar and carbohydrate intake.  She was able to come off of the diabetic medication.  Labs are followed by primary care with plans to repeat tomorrow.  Blood pressure has been well controlled.  Refills requested.         Cardiac History:    Past Surgical History:   Procedure Laterality Date   • CARDIOVASCULAR STRESS TEST  01/08/2020    7 Min. 7. METS. 89% THR. BP- 158/84. EF > 70%. Breast Attenuation.   • ECHO - CONVERTED  01/04/2018    EF 65%. RVSP- 31 mmHg   • ECHO - CONVERTED  01/08/2020    EF 60%. LA- 3.9 Cm. Mild- Mod MR. RVSP- 34 mmHg.   • PACEMAKER IMPLANTATION  11/26/2014    Medtronic  @ [facility] [place]   • US CAROTID UNILATERAL  01/02/2020    50% on the left, none on right     Current Outpatient Medications   Medication Sig Dispense Refill   • aspirin 81 MG EC tablet Take 81 mg by mouth Daily.     • atorvastatin (LIPITOR) 10 MG tablet Take 1 tablet by mouth Daily. 90 tablet 3   • Cholecalciferol (VITAMIN D) 2000 units tablet Take 2,000 Units by mouth Daily.     • cyanocobalamin (CVS VITAMIN B-12) 2000 MCG tablet Take 2,000 mcg by mouth Daily.     • hydroCHLOROthiazide (HYDRODIURIL) 25 MG tablet Take 1 tablet by mouth Daily. 90 tablet 3   • losartan (COZAAR) 100 MG tablet Take 1 tablet by mouth Daily. 90 tablet 3   • meloxicam (MOBIC) 15 MG tablet Take 15 mg by mouth Daily.     • metoprolol tartrate (LOPRESSOR) 50 MG tablet Take 1 tablet by mouth 2 (Two) Times a Day. 180 tablet 3     No current facility-administered medications for this visit.     Patient has no known allergies.    Past Medical History:   Diagnosis Date   • Breast abrasion    • Diabetes mellitus (CMS/HCC)    • History of kidney stones    • History of lumpectomy    • History of tonsillectomy    • Hypertension    • Pacemaker     Medtronic   • Tennis elbow      Social History     Socioeconomic History   • Marital status: Unknown     Spouse name: Not on file   • Number of children: Not on file   • Years of education: Not on file   • Highest education level: Not on file   Tobacco Use   • Smoking status: Never Smoker   • Smokeless tobacco: Never Used   Vaping Use   • Vaping Use: Never used   Substance and Sexual Activity   • Alcohol use: Yes     Comment: occasionally   • Drug use: No     Family History   Problem Relation Age of Onset   • Heart attack Mother    • Hypertension Mother    • Hyperlipidemia Mother    • Cancer Mother    • Stroke Father    • Hyperlipidemia Father    • Hypertension Father    • Cancer Father    • Heart disease Sister    • Diabetes Sister    • Stroke Sister    • Heart disease Brother    • Diabetes Brother    •  "Stroke Brother    • Stroke Maternal Grandmother    • Heart attack Maternal Grandfather    • Heart attack Paternal Grandmother    • Stroke Paternal Grandmother    • Heart attack Paternal Grandfather    • Stroke Paternal Grandfather      Review of Systems   Constitutional: Positive for weight gain (Up 3 LB). Negative for decreased appetite and malaise/fatigue.   HENT: Negative.    Eyes: Negative for blurred vision.   Cardiovascular: Negative for chest pain, dyspnea on exertion, leg swelling, palpitations and syncope.   Respiratory: Negative for shortness of breath and sleep disturbances due to breathing.    Endocrine: Negative.    Hematologic/Lymphatic: Negative for bleeding problem. Does not bruise/bleed easily.   Skin: Negative.    Musculoskeletal: Negative for falls and myalgias.   Gastrointestinal: Negative for abdominal pain, heartburn and melena.   Genitourinary: Negative for hematuria.   Neurological: Negative for dizziness and light-headedness.   Psychiatric/Behavioral: Negative for altered mental status.   Allergic/Immunologic: Negative.       Objective     /70 (BP Location: Right arm)   Pulse 69   Temp 97.7 °F (36.5 °C)   Ht 160 cm (62.99\")   Wt 91.5 kg (201 lb 12.8 oz)   BMI 35.76 kg/m²     Vitals and nursing note reviewed.   Constitutional:       General: Not in acute distress.     Appearance: Well-developed. Not diaphoretic.   Eyes:      Pupils: Pupils are equal, round, and reactive to light.   HENT:      Head: Normocephalic.   Pulmonary:      Effort: Pulmonary effort is normal. No respiratory distress.      Breath sounds: Normal breath sounds.   Cardiovascular:      Normal rate. Regular rhythm.   Pulses:     Intact distal pulses.   Abdominal:      General: Bowel sounds are normal.      Palpations: Abdomen is soft.   Musculoskeletal: Normal range of motion.      Cervical back: Normal range of motion. Skin:     General: Skin is warm and dry.   Neurological:      Mental Status: Alert and oriented " to person, place, and time.          ECG 12 Lead    Date/Time: 5/11/2021 9:48 AM  Performed by: Alisa Morse APRN  Authorized by: Alisa Morse APRN   Comparison: compared with previous ECG from 11/11/2020  Similar to previous ECG  Rhythm: paced  Ectopy: unifocal PVCs  Rate: normal  BPM: 69  ST Segments: ST segments normal    Clinical impression: abnormal EKG  Comments: Atrial paced  Ventricular sensed  1 PVC                Problem List Items Addressed This Visit        Cardiac and Vasculature    SSS (sick sinus syndrome) (CMS/HCC)    Relevant Medications    metoprolol tartrate (LOPRESSOR) 50 MG tablet    Essential hypertension    Relevant Medications    losartan (COZAAR) 100 MG tablet    metoprolol tartrate (LOPRESSOR) 50 MG tablet    hydroCHLOROthiazide (HYDRODIURIL) 25 MG tablet    Other Relevant Orders    US Carotid Bilateral    Hypercholesteremia    Relevant Medications    atorvastatin (LIPITOR) 10 MG tablet    Other Relevant Orders    US Carotid Bilateral    PVC (premature ventricular contraction)    Relevant Medications    metoprolol tartrate (LOPRESSOR) 50 MG tablet    Nonrheumatic mitral valve regurgitation    Relevant Medications    metoprolol tartrate (LOPRESSOR) 50 MG tablet      Other Visit Diagnoses     Stenosis of left carotid artery    -  Primary    Relevant Orders    US Carotid Bilateral         1.  SSS/PVC -Medtronic device check to be scheduled, 1 brief rapid atrial rate.  Continue metoprolol.  Continue to monitor.  EKG today showed atrial pacing, ventricular sensing, 1 PVC seen.    2.  HTN-very well controlled with losartan, HCTZ, metoprolol.  Limit sodium.  Weight loss.  Continue regular exercise.    3.  Hyperlipidemia-managed with atorvastatin 10 mg, tolerates well.  Labs in 2019 showed LDL at goal at 72.  Triglycerides borderline elevated.  She plans to have labs rechecked this week with Karlee.  Could we get a copy?    4.  Carotid stenosis-mild to moderate on left, will repeat carotid  ultrasound for routine surveillance.  If unchanged, will repeat in 1 to 2 years.  Continue aspirin, statin, antihypertensives.  She does mention very strong family history of stroke.    5.  Mitral regurgitation-mild to moderate by echocardiogram in 2020.  She remains fairly asymptomatic.  Continue to manage conservatively by keeping the blood pressure well controlled.    Patient's Body mass index is 35.76 kg/m². indicating that she is obese (BMI >30). Obesity-related health conditions include the following: obstructive sleep apnea, hypertension, diabetes mellitus and dyslipidemias. Obesity is unchanged. BMI is is above average; BMI management plan is completed. We discussed portion control and increasing exercise.  She has resumed regular exercise, walking 5 days/week.  Encouraged to keep activity level up.               Electronically signed by TROY Bonilla,  May 11, 2021 09:59 EDT

## 2021-05-19 ENCOUNTER — HOSPITAL ENCOUNTER (OUTPATIENT)
Dept: CARDIOLOGY | Facility: HOSPITAL | Age: 71
Discharge: HOME | End: 2021-05-19
Admitting: NURSE PRACTITIONER

## 2021-05-19 DIAGNOSIS — I65.22 STENOSIS OF LEFT CAROTID ARTERY: ICD-10-CM

## 2021-05-19 DIAGNOSIS — I10 ESSENTIAL HYPERTENSION: ICD-10-CM

## 2021-05-19 DIAGNOSIS — E78.00 HYPERCHOLESTEREMIA: ICD-10-CM

## 2021-05-19 PROCEDURE — 93880 EXTRACRANIAL BILAT STUDY: CPT | Performed by: RADIOLOGY

## 2021-05-19 PROCEDURE — 93880 EXTRACRANIAL BILAT STUDY: CPT

## 2021-05-26 ENCOUNTER — OFFICE VISIT (OUTPATIENT)
Dept: CARDIOLOGY | Facility: CLINIC | Age: 71
End: 2021-05-26

## 2021-05-26 DIAGNOSIS — I49.5 SSS (SICK SINUS SYNDROME) (HCC): Primary | ICD-10-CM

## 2021-05-26 DIAGNOSIS — R00.2 PALPITATIONS: ICD-10-CM

## 2021-05-26 PROCEDURE — 93288 INTERROG EVL PM/LDLS PM IP: CPT | Performed by: INTERNAL MEDICINE

## 2021-11-30 ENCOUNTER — OFFICE VISIT (OUTPATIENT)
Dept: CARDIOLOGY | Facility: CLINIC | Age: 71
End: 2021-11-30

## 2021-11-30 VITALS
TEMPERATURE: 97.1 F | SYSTOLIC BLOOD PRESSURE: 142 MMHG | HEART RATE: 75 BPM | BODY MASS INDEX: 34.34 KG/M2 | DIASTOLIC BLOOD PRESSURE: 90 MMHG | WEIGHT: 193.8 LBS | HEIGHT: 63 IN

## 2021-11-30 DIAGNOSIS — E78.00 HYPERCHOLESTEREMIA: ICD-10-CM

## 2021-11-30 DIAGNOSIS — I49.5 SSS (SICK SINUS SYNDROME) (HCC): Primary | ICD-10-CM

## 2021-11-30 DIAGNOSIS — I34.0 NONRHEUMATIC MITRAL VALVE REGURGITATION: ICD-10-CM

## 2021-11-30 DIAGNOSIS — R06.02 SHORTNESS OF BREATH: ICD-10-CM

## 2021-11-30 DIAGNOSIS — I49.3 PVC (PREMATURE VENTRICULAR CONTRACTION): ICD-10-CM

## 2021-11-30 DIAGNOSIS — I10 ESSENTIAL HYPERTENSION: ICD-10-CM

## 2021-11-30 PROCEDURE — 99214 OFFICE O/P EST MOD 30 MIN: CPT | Performed by: NURSE PRACTITIONER

## 2021-11-30 PROCEDURE — 93000 ELECTROCARDIOGRAM COMPLETE: CPT | Performed by: NURSE PRACTITIONER

## 2021-11-30 RX ORDER — HYDROCHLOROTHIAZIDE 25 MG/1
25 TABLET ORAL DAILY
Qty: 90 TABLET | Refills: 3 | Status: SHIPPED | OUTPATIENT
Start: 2021-11-30 | End: 2023-03-22 | Stop reason: SDUPTHER

## 2021-11-30 RX ORDER — METOPROLOL TARTRATE 50 MG/1
TABLET, FILM COATED ORAL
Qty: 270 TABLET | Refills: 3 | Status: SHIPPED | OUTPATIENT
Start: 2021-11-30 | End: 2023-01-17 | Stop reason: SDUPTHER

## 2021-11-30 RX ORDER — ATORVASTATIN CALCIUM 10 MG/1
10 TABLET, FILM COATED ORAL DAILY
Qty: 90 TABLET | Refills: 3 | Status: SHIPPED | OUTPATIENT
Start: 2021-11-30 | End: 2023-03-22 | Stop reason: SDUPTHER

## 2021-11-30 RX ORDER — LOSARTAN POTASSIUM 100 MG/1
100 TABLET ORAL DAILY
Qty: 90 TABLET | Refills: 3 | Status: SHIPPED | OUTPATIENT
Start: 2021-11-30 | End: 2023-03-22 | Stop reason: SDUPTHER

## 2021-11-30 NOTE — PROGRESS NOTES
Chief Complaint   Patient presents with   • Follow-up     Cardiac management   • Lab     Last labs about 3 months ago per PCP.   • Pacemaker Check     Last Medtronic PPM interrogation 5/26/21.   • Shortness of Breath     Notices with exertion and at rest.   • Med Refill     Needs refills on cardiac medications-90 day. Patient did not bring medication list, went over verbally.     Subjective       Brandie Nesbitt is a 70 y.o. female with hypertension, hyperlipidemia, PVCs and SSS diagnosed in 2014 when she had a Medtronic pacemaker placed in [place]. She established care in January 2018 after moving to [place]. EKG at consult showed atrial pacing, occasional PVC. Lopressor 25 mg BID added.  Echo stable.  In January 2020, she reported increasing HOYOS. Stress test and echocardiogram showed normal LVEF, no ischemia.  Carotid US showed 50% LICA, normal right. Repeated 5/19/2021 remained stable.  LDL well controlled at 72 with Lipitor.  Last A1c 7.2%. Device check  5/26/2021 showed 99% RA paced, 4 years battery life.     She returns today for regular follow-up.  Continues to have mild to moderate dyspnea on exertion, mild at rest.  Denies chest pain.  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  Labs per PCP reported as well controlled.       Cardiac History:    Past Surgical History:   Procedure Laterality Date   • CARDIOVASCULAR STRESS TEST  01/08/2020    7 Min. 7. METS. 89% THR. BP- 158/84. EF > 70%. Breast Attenuation.   • ECHO - CONVERTED  01/04/2018    EF 65%. RVSP- 31 mmHg   • ECHO - CONVERTED  01/08/2020    EF 60%. LA- 3.9 Cm. Mild- Mod MR. RVSP- 34 mmHg.   • PACEMAKER IMPLANTATION  11/26/2014    Medtronic @ [facility]   • US CAROTID UNILATERAL  01/02/2020    50% on the left, none on right     Current Outpatient Medications   Medication Sig Dispense Refill   • aspirin 81 MG EC tablet Take 81 mg by mouth Daily.     • atorvastatin (LIPITOR) 10 MG tablet Take 1 tablet by mouth Daily. 90  tablet 3   • Cholecalciferol (VITAMIN D) 2000 units tablet Take 2,000 Units by mouth Daily.     • cyanocobalamin ([employer] VITAMIN B-12) 2000 MCG tablet Take 2,000 mcg by mouth Daily.     • hydroCHLOROthiazide (HYDRODIURIL) 25 MG tablet Take 1 tablet by mouth Daily. 90 tablet 3   • losartan (COZAAR) 100 MG tablet Take 1 tablet by mouth Daily. 90 tablet 3   • meloxicam (MOBIC) 15 MG tablet Take 15 mg by mouth Daily As Needed.     • metoprolol tartrate (LOPRESSOR) 50 MG tablet Increase to 1 1/2 tablets twice daily (75 mg BID) 270 tablet 3     No current facility-administered medications for this visit.     Patient has no known allergies.    Past Medical History:   Diagnosis Date   • Breast abrasion    • Diabetes mellitus (HCC)    • History of kidney stones    • History of lumpectomy    • History of tonsillectomy    • Hypertension    • Pacemaker     Medtronic   • Tennis elbow      Social History     Socioeconomic History   • Marital status: Unknown   Tobacco Use   • Smoking status: Never Smoker   • Smokeless tobacco: Never Used   Vaping Use   • Vaping Use: Never used   Substance and Sexual Activity   • Alcohol use: Yes     Comment: rarely   • Drug use: No     Family History   Problem Relation Age of Onset   • Heart attack Mother    • Hypertension Mother    • Hyperlipidemia Mother    • Cancer Mother    • Stroke Father    • Hyperlipidemia Father    • Hypertension Father    • Cancer Father    • Heart disease Sister    • Diabetes Sister    • Stroke Sister    • Heart disease Brother    • Diabetes Brother    • Stroke Brother    • Stroke Maternal Grandmother    • Heart attack Maternal Grandfather    • Heart attack Paternal Grandmother    • Stroke Paternal Grandmother    • Heart attack Paternal Grandfather    • Stroke Paternal Grandfather      Review of Systems   Constitutional: Positive for weight loss (Down 8 pounds). Negative for decreased appetite and malaise/fatigue.   HENT: Negative.    Eyes: Negative for blurred vision.  "  Cardiovascular: Positive for dyspnea on exertion. Negative for chest pain, leg swelling, palpitations and syncope.   Respiratory: Positive for shortness of breath. Negative for sleep disturbances due to breathing.    Endocrine: Negative.    Hematologic/Lymphatic: Negative for bleeding problem. Does not bruise/bleed easily.   Skin: Negative.    Musculoskeletal: Negative for falls and myalgias.   Gastrointestinal: Negative for abdominal pain, heartburn and melena.   Genitourinary: Negative for hematuria.   Neurological: Negative for dizziness and light-headedness.   Psychiatric/Behavioral: Negative for altered mental status.   Allergic/Immunologic: Negative.       Objective     /90 (BP Location: Left arm)   Pulse 75   Temp 97.1 °F (36.2 °C)   Ht 160 cm (62.99\")   Wt 87.9 kg (193 lb 12.8 oz)   BMI 34.34 kg/m²     Vitals and nursing note reviewed.   Constitutional:       General: Not in acute distress.     Appearance: Well-developed. Not diaphoretic.   Eyes:      Pupils: Pupils are equal, round, and reactive to light.   HENT:      Head: Normocephalic.   Pulmonary:      Effort: Pulmonary effort is normal. No respiratory distress.      Breath sounds: Normal breath sounds.   Cardiovascular:      Normal rate. Occasional ectopic beats. Regular rhythm.      Murmurs: There is a grade 1 to 2/6 systolic murmur at the apex.   Pulses:     Intact distal pulses.   Edema:     Peripheral edema absent.   Abdominal:      General: Bowel sounds are normal.      Palpations: Abdomen is soft.   Musculoskeletal: Normal range of motion.      Cervical back: Normal range of motion. Skin:     General: Skin is warm and dry.   Neurological:      Mental Status: Alert and oriented to person, place, and time.          ECG 12 Lead    Date/Time: 11/30/2021 11:51 AM  Performed by: Alisa Morse APRN  Authorized by: Alisa Morse APRN   Comparison: compared with previous ECG   Rhythm: paced  Ectopy: unifocal PVCs  Rate: normal  BPM: 75  ST " Segments: ST segments normal    Clinical impression: abnormal EKG  Comments: Atrial pacing with intermittent PVC                Problem List Items Addressed This Visit        Cardiac and Vasculature    SSS (sick sinus syndrome) (HCC) - Primary    Relevant Medications    metoprolol tartrate (LOPRESSOR) 50 MG tablet    Other Relevant Orders    Adult Transthoracic Echo Complete W/ Cont if Necessary Per Protocol    Essential hypertension    Relevant Medications    hydroCHLOROthiazide (HYDRODIURIL) 25 MG tablet    metoprolol tartrate (LOPRESSOR) 50 MG tablet    losartan (COZAAR) 100 MG tablet    Other Relevant Orders    Adult Transthoracic Echo Complete W/ Cont if Necessary Per Protocol    Hypercholesteremia    Relevant Medications    atorvastatin (LIPITOR) 10 MG tablet    PVC (premature ventricular contraction)    Relevant Medications    metoprolol tartrate (LOPRESSOR) 50 MG tablet    Other Relevant Orders    Adult Transthoracic Echo Complete W/ Cont if Necessary Per Protocol    Nonrheumatic mitral valve regurgitation    Relevant Medications    metoprolol tartrate (LOPRESSOR) 50 MG tablet    Other Relevant Orders    Adult Transthoracic Echo Complete W/ Cont if Necessary Per Protocol      Other Visit Diagnoses     Shortness of breath        Relevant Orders    Adult Transthoracic Echo Complete W/ Cont if Necessary Per Protocol         1.  HTN-blood pressure slightly elevated at 142/90.  Continue losartan 100 mg, HCTZ 25 mg.  Increase metoprolol to 1-1/2 tablets twice daily (75 mg BID).  Hopefully this will improve the blood pressure as well as decrease PVC frequency.  Limit sodium.  Continue weight loss efforts.    2.  Hyperlipidemia-well controlled with Lipitor.  Labs followed by PCP    3.  SSS/PVC -EKG today shows atrial pacing with PVCs.  We will recheck her device.  Increase beta-blocker.  If PVC burden high on next pacemaker check, may consider changing to sotalol.  Can we add magnesium level to next labs?    4.   Carotid ultrasound-repeated 5/2021 showed no obstructive disease.  Will plan to repeat in a couple of years.    5.  Shortness of breath-repeat echocardiogram to evaluate LVEF, PA pressure and mitral regurgitation.  If cardiac findings are stable, may consider pulmonary consult or sleep apnea work-up?    Further recommendations to follow echocardiogram, device check and response to increase beta-blocker.  We will see her back in 6 months or sooner as needed.    Patient's Body mass index is 34.34 kg/m². indicating that she is overweight (BMI 25-29.9). Obesity-related health conditions include the following: obstructive sleep apnea, hypertension, coronary heart disease, diabetes mellitus and dyslipidemias. Obesity is improving with lifestyle modifications. BMI is is above average; BMI management plan is completed. We discussed portion control and increasing exercise..               Electronically signed by TROY Bonilla,  November 30, 2021 11:53 EST

## 2021-12-22 ENCOUNTER — OFFICE VISIT (OUTPATIENT)
Dept: CARDIOLOGY | Facility: CLINIC | Age: 71
End: 2021-12-22

## 2021-12-22 DIAGNOSIS — R00.2 PALPITATIONS: ICD-10-CM

## 2021-12-22 DIAGNOSIS — I49.5 SSS (SICK SINUS SYNDROME) (HCC): Primary | ICD-10-CM

## 2021-12-22 PROCEDURE — 93288 INTERROG EVL PM/LDLS PM IP: CPT | Performed by: INTERNAL MEDICINE

## 2021-12-30 ENCOUNTER — HOSPITAL ENCOUNTER (OUTPATIENT)
Dept: CARDIOLOGY | Facility: HOSPITAL | Age: 71
Discharge: HOME | End: 2021-12-30
Admitting: NURSE PRACTITIONER

## 2021-12-30 VITALS — WEIGHT: 193.78 LBS | HEIGHT: 63 IN | BODY MASS INDEX: 34.34 KG/M2

## 2021-12-30 DIAGNOSIS — I10 ESSENTIAL HYPERTENSION: ICD-10-CM

## 2021-12-30 DIAGNOSIS — R06.02 SHORTNESS OF BREATH: ICD-10-CM

## 2021-12-30 DIAGNOSIS — I49.5 SSS (SICK SINUS SYNDROME) (HCC): ICD-10-CM

## 2021-12-30 DIAGNOSIS — I34.0 NONRHEUMATIC MITRAL VALVE REGURGITATION: ICD-10-CM

## 2021-12-30 DIAGNOSIS — I49.3 PVC (PREMATURE VENTRICULAR CONTRACTION): ICD-10-CM

## 2021-12-30 PROCEDURE — 93306 TTE W/DOPPLER COMPLETE: CPT | Performed by: INTERNAL MEDICINE

## 2021-12-30 PROCEDURE — 93306 TTE W/DOPPLER COMPLETE: CPT

## 2021-12-31 LAB
AORTIC DIMENSIONLESS INDEX: 0.8 (DI)
BH CV ECHO MEAS - ACS: 1.8 CM
BH CV ECHO MEAS - AO MAX PG (FULL): 2.1 MMHG
BH CV ECHO MEAS - AO MAX PG: 6.7 MMHG
BH CV ECHO MEAS - AO MEAN PG (FULL): 1 MMHG
BH CV ECHO MEAS - AO MEAN PG: 3 MMHG
BH CV ECHO MEAS - AO ROOT AREA (BSA CORRECTED): 1.5
BH CV ECHO MEAS - AO ROOT AREA: 6.2 CM^2
BH CV ECHO MEAS - AO ROOT DIAM: 2.8 CM
BH CV ECHO MEAS - AO V2 MAX: 129 CM/SEC
BH CV ECHO MEAS - AO V2 MEAN: 86.6 CM/SEC
BH CV ECHO MEAS - AO V2 VTI: 33.3 CM
BH CV ECHO MEAS - BSA(HAYCOCK): 2 M^2
BH CV ECHO MEAS - BSA: 1.9 M^2
BH CV ECHO MEAS - BZI_BMI: 34.2 KILOGRAMS/M^2
BH CV ECHO MEAS - BZI_METRIC_HEIGHT: 160 CM
BH CV ECHO MEAS - BZI_METRIC_WEIGHT: 87.5 KG
BH CV ECHO MEAS - EDV(CUBED): 79.5 ML
BH CV ECHO MEAS - EDV(TEICH): 83.1 ML
BH CV ECHO MEAS - EF(CUBED): 73 %
BH CV ECHO MEAS - EF(TEICH): 65 %
BH CV ECHO MEAS - ESV(CUBED): 21.5 ML
BH CV ECHO MEAS - ESV(TEICH): 29 ML
BH CV ECHO MEAS - FS: 35.3 %
BH CV ECHO MEAS - IVS/LVPW: 0.99
BH CV ECHO MEAS - IVSD: 1 CM
BH CV ECHO MEAS - LA DIMENSION: 4.2 CM
BH CV ECHO MEAS - LA/AO: 1.5
BH CV ECHO MEAS - LAT PEAK E' VEL: 8.3 CM/SEC
BH CV ECHO MEAS - LV IVRT: 0.12 SEC
BH CV ECHO MEAS - LV MASS(C)D: 151.5 GRAMS
BH CV ECHO MEAS - LV MASS(C)DI: 79.6 GRAMS/M^2
BH CV ECHO MEAS - LV MAX PG: 4.6 MMHG
BH CV ECHO MEAS - LV MEAN PG: 2 MMHG
BH CV ECHO MEAS - LV V1 MAX: 107 CM/SEC
BH CV ECHO MEAS - LV V1 MEAN: 62.9 CM/SEC
BH CV ECHO MEAS - LV V1 VTI: 26.9 CM
BH CV ECHO MEAS - LVIDD: 4.3 CM
BH CV ECHO MEAS - LVIDS: 2.8 CM
BH CV ECHO MEAS - LVPWD: 1.1 CM
BH CV ECHO MEAS - MED PEAK E' VEL: 7 CM/SEC
BH CV ECHO MEAS - MV A MAX VEL: 63.7 CM/SEC
BH CV ECHO MEAS - MV DEC SLOPE: 438 CM/SEC^2
BH CV ECHO MEAS - MV DEC TIME: 0.28 SEC
BH CV ECHO MEAS - MV E MAX VEL: 91.5 CM/SEC
BH CV ECHO MEAS - MV E/A: 1.4
BH CV ECHO MEAS - MV MAX PG: 5.6 MMHG
BH CV ECHO MEAS - MV MEAN PG: 2 MMHG
BH CV ECHO MEAS - MV P1/2T MAX VEL: 110 CM/SEC
BH CV ECHO MEAS - MV P1/2T: 73.6 MSEC
BH CV ECHO MEAS - MV V2 MAX: 118 CM/SEC
BH CV ECHO MEAS - MV V2 MEAN: 67.8 CM/SEC
BH CV ECHO MEAS - MV V2 VTI: 37.4 CM
BH CV ECHO MEAS - MVA P1/2T LCG: 2 CM^2
BH CV ECHO MEAS - MVA(P1/2T): 3 CM^2
BH CV ECHO MEAS - PA MAX PG (FULL): 1.6 MMHG
BH CV ECHO MEAS - PA MAX PG: 4.2 MMHG
BH CV ECHO MEAS - PA MEAN PG (FULL): 1 MMHG
BH CV ECHO MEAS - PA MEAN PG: 2 MMHG
BH CV ECHO MEAS - PA V2 MAX: 103 CM/SEC
BH CV ECHO MEAS - PA V2 MEAN: 68.4 CM/SEC
BH CV ECHO MEAS - PA V2 VTI: 24.9 CM
BH CV ECHO MEAS - RAP SYSTOLE: 10 MMHG
BH CV ECHO MEAS - RV MAX PG: 2.7 MMHG
BH CV ECHO MEAS - RV MEAN PG: 1 MMHG
BH CV ECHO MEAS - RV V1 MAX: 81.6 CM/SEC
BH CV ECHO MEAS - RV V1 MEAN: 54.3 CM/SEC
BH CV ECHO MEAS - RV V1 VTI: 22.5 CM
BH CV ECHO MEAS - RVDD: 3.4 CM
BH CV ECHO MEAS - RVSP: 42 MMHG
BH CV ECHO MEAS - SI(AO): 107.6 ML/M^2
BH CV ECHO MEAS - SI(CUBED): 30.5 ML/M^2
BH CV ECHO MEAS - SI(TEICH): 28.4 ML/M^2
BH CV ECHO MEAS - SV(AO): 205 ML
BH CV ECHO MEAS - SV(CUBED): 58 ML
BH CV ECHO MEAS - SV(TEICH): 54 ML
BH CV ECHO MEAS - TR MAX VEL: 281 CM/SEC
BH CV ECHO MEASUREMENTS AVERAGE E/E' RATIO: 11.96
IVRT: 121 MSEC
MAXIMAL PREDICTED HEART RATE: 151 BPM
SINUS: 2.6 CM
STJ: 2.2 CM
STRESS TARGET HR: 128 BPM

## 2022-01-03 DIAGNOSIS — R06.02 SHORTNESS OF BREATH: ICD-10-CM

## 2022-01-03 DIAGNOSIS — I49.5 SSS (SICK SINUS SYNDROME): ICD-10-CM

## 2022-01-03 DIAGNOSIS — I10 ESSENTIAL HYPERTENSION: Primary | ICD-10-CM

## 2022-02-22 ENCOUNTER — TELEPHONE (OUTPATIENT)
Dept: CARDIOLOGY | Facility: CLINIC | Age: 72
End: 2022-02-22

## 2022-02-22 NOTE — TELEPHONE ENCOUNTER
Pts  here for fu.    Asked after metoprolol causing hair loss in his wife. Wanted us to call her.     Last visit, metoprolol 50 mg BID increased to 75 mg BID for bp 142/90 and PVCs noted on pacer check. Since the dose increase, she has noticed increasing hair loss.     Can we ask her to resume 50 mg BID. Monitor bp for one week. If elevated, will add diltiazem.

## 2022-02-28 NOTE — TELEPHONE ENCOUNTER
Patient made aware can resume Metoprolol 50 mg bid, monitor B/P for one week and call office back with readings, patient verbalized understanding.

## 2022-05-27 ENCOUNTER — TELEPHONE (OUTPATIENT)
Dept: CARDIOLOGY | Facility: CLINIC | Age: 72
End: 2022-05-27

## 2022-05-27 NOTE — TELEPHONE ENCOUNTER
Left msg (at the pt's request- she is at work) for pt making aware- she was instructed to call back if she had any cardiac issues that she had previously not mentioned.

## 2022-05-27 NOTE — TELEPHONE ENCOUNTER
I do not see anything on the CT that is abnormal from a cardiac standpoint. She had trace pleural effusion.      Did pulmonary tell her she needed an urgent appt? She can wait until July if she has no new symptoms.

## 2022-05-27 NOTE — TELEPHONE ENCOUNTER
Pt called stating she was told that she needed to scheduled an apt with us after her CT scan results.    Do you want her moved up or is her July 5th apt okay?

## 2022-07-05 ENCOUNTER — OFFICE VISIT (OUTPATIENT)
Dept: CARDIOLOGY | Facility: CLINIC | Age: 72
End: 2022-07-05

## 2022-07-05 VITALS
WEIGHT: 198.8 LBS | DIASTOLIC BLOOD PRESSURE: 74 MMHG | BODY MASS INDEX: 35.22 KG/M2 | HEART RATE: 72 BPM | HEIGHT: 63 IN | SYSTOLIC BLOOD PRESSURE: 142 MMHG

## 2022-07-05 DIAGNOSIS — E11.9 TYPE 2 DIABETES MELLITUS WITHOUT COMPLICATION, WITHOUT LONG-TERM CURRENT USE OF INSULIN: ICD-10-CM

## 2022-07-05 DIAGNOSIS — J90 PLEURAL EFFUSION: ICD-10-CM

## 2022-07-05 DIAGNOSIS — I49.5 SSS (SICK SINUS SYNDROME): Primary | ICD-10-CM

## 2022-07-05 DIAGNOSIS — R06.02 SHORTNESS OF BREATH: ICD-10-CM

## 2022-07-05 DIAGNOSIS — I10 ESSENTIAL HYPERTENSION: ICD-10-CM

## 2022-07-05 DIAGNOSIS — I34.0 NONRHEUMATIC MITRAL VALVE REGURGITATION: ICD-10-CM

## 2022-07-05 DIAGNOSIS — I49.3 PVC (PREMATURE VENTRICULAR CONTRACTION): ICD-10-CM

## 2022-07-05 DIAGNOSIS — E78.00 HYPERCHOLESTEREMIA: ICD-10-CM

## 2022-07-05 DIAGNOSIS — R00.2 PALPITATIONS: ICD-10-CM

## 2022-07-05 PROCEDURE — 99214 OFFICE O/P EST MOD 30 MIN: CPT | Performed by: NURSE PRACTITIONER

## 2022-07-05 NOTE — PROGRESS NOTES
Chief Complaint   Patient presents with   • Follow-up     Cardiac management   • Lab     Last labs per PCP couple months ago.   • Pacemaker Check     Last Medtronic PPM interrogation 12/22/21   • Shortness of Breath     Notices when bending over and with exertion.   • Med Refill     Needs refills on cardiac medications-90 day.   • Sleep apnea     Has new dx, unable to wear CPAP. Following with Dr Carson.     Subjective       Brandie Nesbitt is a 71 y.o. female with hypertension, hyperlipidemia, PVCs and SSS diagnosed in 2014 when she had a Medtronic pacemaker placed in [place]. She established care in January 2018 after moving to [place]. EKG at consult showed atrial pacing, occasional PVC. Lopressor 25 mg BID added.  Echo stable.  In January 2020, she reported increasing HOYOS. Stress test and echocardiogram showed normal LVEF, no ischemia.  Carotid US showed 50% LICA, normal right. Repeated 5/19/2021 remained stable.  LDL well controlled at 72 with Lipitor.  Last A1c 7.2%. Medtronic device check 12/22/2021 showed 99% atrial paced, 1 NSVT 7 beat,no changes made, 3 years battery life.       She returns today for regular follow-up.  Since she was last seen, she saw Jaja Mcnair NP, with Dr. Anitha Carson, and a CT scan of her lungs was performed on 05/02/2022. Dr. Carson called our office to let us know the results.  Trace pleural effusions which was a new finding, stable nodule to right lower lobe 0.5 cm.  She denies new or worsening shortness of breath.  She has shortness of breath with bending over or walking uphill.  Denies chest pain. Last visit we increased her metoprolol dose due to experiencing PVCs, and some palpitations. Her  also stated last visit the patient was having significant hair loss with increasing the dose, and so we reduced the medication to the previous dose. She reports the hair loss has improved. She denies having contracted COVID-19, which can cause hair loss. The  patient states she would like to take a medication to help her lose weight.          Cardiac History:    Past Surgical History:   Procedure Laterality Date   • CARDIOVASCULAR STRESS TEST  01/08/2020    7 Min. 7. METS. 89% THR. BP- 158/84. EF > 70%. Breast Attenuation.   • ECHO - CONVERTED  01/04/2018    EF 65%. RVSP- 31 mmHg   • ECHO - CONVERTED  01/08/2020    EF 60%. LA- 3.9 Cm. Mild- Mod MR. RVSP- 34 mmHg.   • ECHO - CONVERTED  12/30/2021    EF 60%. LA- 4.2 Cm. Mild MR. RVSP- 42 mmHg   • PACEMAKER IMPLANTATION  11/26/2014    Medtronic @ [facility]   • US CAROTID UNILATERAL  01/02/2020    50% on the left, none on right   • US CAROTID UNILATERAL  05/19/2021    <50% bilaterally     Current Outpatient Medications   Medication Sig Dispense Refill   • aspirin 81 MG EC tablet Take 81 mg by mouth Daily.     • atorvastatin (LIPITOR) 10 MG tablet Take 1 tablet by mouth Daily. 90 tablet 3   • Cholecalciferol (VITAMIN D) 2000 units tablet Take 2,000 Units by mouth Daily.     • cyanocobalamin (VITAMIN B-12) 2000 MCG tablet Take 2,000 mcg by mouth Daily.     • hydroCHLOROthiazide (HYDRODIURIL) 25 MG tablet Take 1 tablet by mouth Daily. 90 tablet 3   • losartan (COZAAR) 100 MG tablet Take 1 tablet by mouth Daily. 90 tablet 3   • meloxicam (MOBIC) 15 MG tablet Take 15 mg by mouth Daily As Needed.     • metoprolol tartrate (LOPRESSOR) 50 MG tablet Increase to 1 1/2 tablets twice daily (75 mg BID) (Patient taking differently: Take 50 mg by mouth 2 (Two) Times a Day.) 270 tablet 3     No current facility-administered medications for this visit.     Patient has no known allergies.    Past Medical History:   Diagnosis Date   • Breast abrasion    • Diabetes mellitus (HCC)    • History of kidney stones    • History of lumpectomy    • History of tonsillectomy    • Hypertension    • Pacemaker     Medtronic   • Sleep apnea    • Tennis elbow      Social History     Socioeconomic History   • Marital status: Unknown   Tobacco Use   •  "Smoking status: Never Smoker   • Smokeless tobacco: Never Used   Vaping Use   • Vaping Use: Never used   Substance and Sexual Activity   • Alcohol use: Yes     Comment: rarely   • Drug use: No     Family History   Problem Relation Age of Onset   • Heart attack Mother    • Hypertension Mother    • Hyperlipidemia Mother    • Cancer Mother    • Stroke Father    • Hyperlipidemia Father    • Hypertension Father    • Cancer Father    • Heart disease Sister    • Diabetes Sister    • Stroke Sister    • Heart disease Brother    • Diabetes Brother    • Stroke Brother    • Stroke Maternal Grandmother    • Heart attack Maternal Grandfather    • Heart attack Paternal Grandmother    • Stroke Paternal Grandmother    • Heart attack Paternal Grandfather    • Stroke Paternal Grandfather      Review of Systems   Constitutional: Negative for decreased appetite and malaise/fatigue.   HENT: Negative.    Eyes: Negative for blurred vision.   Cardiovascular: Negative for chest pain, dyspnea on exertion, leg swelling, palpitations and syncope.   Respiratory: Positive for shortness of breath (Shortness of breath is chronic. She reports if she bends over or walks up a hill she will experience shortness of breath.). Negative for sleep disturbances due to breathing.    Endocrine: Negative.    Hematologic/Lymphatic: Negative for bleeding problem. Does not bruise/bleed easily.   Skin: Negative.    Musculoskeletal: Negative for falls and myalgias.   Gastrointestinal: Negative for abdominal pain, heartburn and melena.   Genitourinary: Negative for hematuria.   Neurological: Negative for dizziness and light-headedness.   Psychiatric/Behavioral: Negative for altered mental status.   Allergic/Immunologic: Negative.       Objective     /74 (BP Location: Left arm)   Pulse 72   Ht 160 cm (62.99\")   Wt 90.2 kg (198 lb 12.8 oz)   BMI 35.22 kg/m²     Vitals and nursing note reviewed.   Constitutional:       General: Not in acute distress.     " Appearance: Well-developed. Not diaphoretic.   Eyes:      Pupils: Pupils are equal, round, and reactive to light.   HENT:      Head: Normocephalic.   Pulmonary:      Effort: Pulmonary effort is normal. No respiratory distress.      Breath sounds: Normal breath sounds.   Cardiovascular:      Normal rate. Regular rhythm.   Pulses:     Intact distal pulses.   Abdominal:      General: Bowel sounds are normal.      Palpations: Abdomen is soft.   Musculoskeletal: Normal range of motion.      Cervical back: Normal range of motion. Skin:     General: Skin is warm and dry.   Neurological:      Mental Status: Alert and oriented to person, place, and time.        Results:  CT scan performed on 05/02/2022 revealed trace pleural effusions in the bases of her lungs with a 0.5 cm noncalcified pulmonary nodule in the posterior right lower lobe, which has a similar appearance on the prior study from more than 10 years ago.       ECG 12 Lead    Date/Time: 7/6/2022 8:55 AM  Performed by: Alisa Morse APRN  Authorized by: Alisa Morse APRN   Comparison: compared with previous ECG from 11/30/2021  Similar to previous ECG  Rhythm: paced  Rate: normal  BPM: 72  ST Segments: ST segments normal    Clinical impression: non-specific ECG  Comments: Atrial paced, ventricular sensed  No PVCs seen today                Problem List Items Addressed This Visit        Cardiac and Vasculature    SSS (sick sinus syndrome) (HCC) - Primary    Relevant Orders    Comprehensive Metabolic Panel    Essential hypertension    Relevant Orders    Adult Transthoracic Echo Complete W/ Cont if Necessary Per Protocol    proBNP    CBC & Differential    Comprehensive Metabolic Panel    Hypercholesteremia    Relevant Orders    Lipid Panel    Comprehensive Metabolic Panel    Palpitations    Relevant Orders    TSH    PVC (premature ventricular contraction)    Relevant Orders    Adult Transthoracic Echo Complete W/ Cont if Necessary Per Protocol    TSH    Magnesium     Comprehensive Metabolic Panel    Nonrheumatic mitral valve regurgitation    Relevant Orders    Adult Transthoracic Echo Complete W/ Cont if Necessary Per Protocol    Comprehensive Metabolic Panel       Endocrine and Metabolic    Type 2 diabetes mellitus, without long-term current use of insulin (HCC)    Relevant Orders    Hemoglobin A1c      Other Visit Diagnoses     Shortness of breath        Relevant Orders    Adult Transthoracic Echo Complete W/ Cont if Necessary Per Protocol    proBNP    CBC & Differential    Comprehensive Metabolic Panel    Pleural effusion        Relevant Orders    proBNP    CBC & Differential    Comprehensive Metabolic Panel        1. Sick sinus syndrome  - We will obtain a comprehensive metabolic panel.     2. Essential hypertension  - We will obtain a CBC, and CMP.     3. Hypocholesteremia  - We will obtain a lipid panel.     4. Palpitations  - Echocardiogram ordered. She will be called to schedule the appointment.    5. PVCs  - Echocardiogram ordered. She will be called to schedule the appointment.  - She was advised to avoid any kind of stimulant type medications because of her PVCs, and it can also drive her blood pressure up.    6. Mitral regurgitation  - Echocardiogram ordered. She will be called to schedule the appointment.    7. Type 2 diabetes  - She is diabetic, and takes metformin 500 mg twice daily. We discussed these medications help control blood glucose as well as weight loss, and are very effective, and safer for cardiac patients.  - The patient will check with her pharmacy again to see if any of the GLP-1 agonist medications are covered by her insurance since last time she checked they were not covered.    8. Weight gain  - The patient would like to start on a medication for weight gain. We discussed weight loss medications such as GLP-1 agonists, which control the hormones that promote satiety.   - The patient will check with her pharmacy again to see if any of the GLP-1  agonist medications are covered.     - She was advised to follow the Mediterranean diet, which is the most heart-healthy diet she can follow. She was encouraged to decrease the amount of carbohydrates that she consumes, increasing protein, drinking plenty of water, and avoiding artificial sweeteners. The patient was provided a handout for the Mediterranean diet today.    9. Shortness of breath  - She was instructed to let us know if she becomes more short of breath or develops a cough, then we will repeat the CT scan sooner than 1 year.     10. Pleural effusion  - A CT scan performed on 05/02/2022 revealed trace pleural effusions in the bases of her lungs with a 0.5 cm noncalcified pulmonary nodule in the posterior right lower lobe, which has a similar appearance on the prior study from more than 10 years ago.   - We discussed that anything less than 1 cm is too small to characterize.   -  It was explained to the patient that infection, inflammation, and congestive heart failure can cause pleural effusion, and that I do not believe she has congestive heart failure.  Will check echocardiogram.  BNP to rule out volume overload.    11. Pacemaker check  - She will be called to schedule an appointment to have her pacemaker evaluated.    She will return for fasting labs.    The patient will be called with the results of her echocardiogram, and labs.      Class 2 Severe Obesity (BMI >=35 and <=39.9). Obesity-related health conditions include the following: obstructive sleep apnea, hypertension, coronary heart disease, diabetes mellitus and dyslipidemias. Obesity is worsening. BMI is is above average; BMI management plan is completed. We discussed portion control and increasing exercise.               Electronically signed by TROY Bonilla,  July 6, 2022 08:53 EDT     Transcribed from ambient dictation for TROY Bonilla by Rosalinda Swanson.  07/05/22   12:42 EDT    Patient verbalized consent to the visit recording.

## 2022-07-06 PROCEDURE — 93000 ELECTROCARDIOGRAM COMPLETE: CPT | Performed by: NURSE PRACTITIONER

## 2022-07-12 ENCOUNTER — LAB (OUTPATIENT)
Dept: LAB | Facility: HOSPITAL | Age: 72
End: 2022-07-12

## 2022-07-12 ENCOUNTER — HOSPITAL ENCOUNTER (OUTPATIENT)
Dept: CARDIOLOGY | Facility: HOSPITAL | Age: 72
Discharge: HOME | End: 2022-07-12

## 2022-07-12 VITALS — HEIGHT: 63 IN | WEIGHT: 198.85 LBS | BODY MASS INDEX: 35.23 KG/M2

## 2022-07-12 DIAGNOSIS — I34.0 NONRHEUMATIC MITRAL VALVE REGURGITATION: ICD-10-CM

## 2022-07-12 DIAGNOSIS — I49.3 PVC (PREMATURE VENTRICULAR CONTRACTION): ICD-10-CM

## 2022-07-12 DIAGNOSIS — I10 ESSENTIAL HYPERTENSION: ICD-10-CM

## 2022-07-12 DIAGNOSIS — I49.5 SSS (SICK SINUS SYNDROME): ICD-10-CM

## 2022-07-12 DIAGNOSIS — E11.9 TYPE 2 DIABETES MELLITUS WITHOUT COMPLICATION, WITHOUT LONG-TERM CURRENT USE OF INSULIN: ICD-10-CM

## 2022-07-12 DIAGNOSIS — R06.02 SHORTNESS OF BREATH: ICD-10-CM

## 2022-07-12 DIAGNOSIS — R00.2 PALPITATIONS: ICD-10-CM

## 2022-07-12 DIAGNOSIS — E78.00 HYPERCHOLESTEREMIA: ICD-10-CM

## 2022-07-12 DIAGNOSIS — J90 PLEURAL EFFUSION: ICD-10-CM

## 2022-07-12 LAB
ALBUMIN SERPL-MCNC: 4.08 G/DL (ref 3.5–5.2)
ALBUMIN/GLOB SERPL: 1.7 G/DL
ALP SERPL-CCNC: 55 U/L (ref 39–117)
ALT SERPL W P-5'-P-CCNC: 52 U/L (ref 1–33)
ANION GAP SERPL CALCULATED.3IONS-SCNC: 14.7 MMOL/L (ref 5–15)
AORTIC DIMENSIONLESS INDEX: 0.66 (DI)
AST SERPL-CCNC: 33 U/L (ref 1–32)
BASOPHILS # BLD AUTO: 0.02 10*3/MM3 (ref 0–0.2)
BASOPHILS NFR BLD AUTO: 0.3 % (ref 0–1.5)
BH CV ECHO MEAS - ACS: 1.68 CM
BH CV ECHO MEAS - AO MAX PG: 8.7 MMHG
BH CV ECHO MEAS - AO MEAN PG: 4.2 MMHG
BH CV ECHO MEAS - AO ROOT DIAM: 2.6 CM
BH CV ECHO MEAS - AO V2 MAX: 147.8 CM/SEC
BH CV ECHO MEAS - AO V2 VTI: 32.6 CM
BH CV ECHO MEAS - EDV(CUBED): 95.2 ML
BH CV ECHO MEAS - EF(MOD-BP): 66 %
BH CV ECHO MEAS - ESV(CUBED): 24.3 ML
BH CV ECHO MEAS - FS: 36.6 %
BH CV ECHO MEAS - IVS/LVPW: 0.97 CM
BH CV ECHO MEAS - IVSD: 0.96 CM
BH CV ECHO MEAS - LA DIMENSION: 4 CM
BH CV ECHO MEAS - LAT PEAK E' VEL: 4.4 CM/SEC
BH CV ECHO MEAS - LV MASS(C)D: 151.5 GRAMS
BH CV ECHO MEAS - LV MAX PG: 3.9 MMHG
BH CV ECHO MEAS - LV MEAN PG: 2.11 MMHG
BH CV ECHO MEAS - LV V1 MAX: 98.2 CM/SEC
BH CV ECHO MEAS - LV V1 VTI: 27.1 CM
BH CV ECHO MEAS - LVIDD: 4.6 CM
BH CV ECHO MEAS - LVIDS: 2.9 CM
BH CV ECHO MEAS - LVPWD: 0.99 CM
BH CV ECHO MEAS - MED PEAK E' VEL: 7.1 CM/SEC
BH CV ECHO MEAS - MV A MAX VEL: 72 CM/SEC
BH CV ECHO MEAS - MV DEC SLOPE: 408.5 CM/SEC2
BH CV ECHO MEAS - MV DEC TIME: 0.23 MSEC
BH CV ECHO MEAS - MV E MAX VEL: 67.4 CM/SEC
BH CV ECHO MEAS - MV E/A: 0.94
BH CV ECHO MEAS - MV MAX PG: 3.4 MMHG
BH CV ECHO MEAS - MV MEAN PG: 1.42 MMHG
BH CV ECHO MEAS - MV P1/2T: 65.1 MSEC
BH CV ECHO MEAS - MV V2 VTI: 29.4 CM
BH CV ECHO MEAS - MVA(P1/2T): 3.4 CM2
BH CV ECHO MEAS - PA V2 MAX: 87.9 CM/SEC
BH CV ECHO MEAS - RAP SYSTOLE: 10 MMHG
BH CV ECHO MEAS - RV MAX PG: 2.5 MMHG
BH CV ECHO MEAS - RV V1 MAX: 79.3 CM/SEC
BH CV ECHO MEAS - RV V1 VTI: 19 CM
BH CV ECHO MEAS - RVDD: 3.2 CM
BH CV ECHO MEAS - RVSP: 27.7 MMHG
BH CV ECHO MEAS - TAPSE (>1.6): 2.36 CM
BH CV ECHO MEAS - TR MAX PG: 17.7 MMHG
BH CV ECHO MEAS - TR MAX VEL: 210.6 CM/SEC
BH CV ECHO MEASUREMENTS AVERAGE E/E' RATIO: 11.72
BH CV XLRA - TDI S': 15.1 CM/SEC
BILIRUB SERPL-MCNC: 0.8 MG/DL (ref 0–1.2)
BUN SERPL-MCNC: 17 MG/DL (ref 8–23)
BUN/CREAT SERPL: 18.5 (ref 7–25)
CALCIUM SPEC-SCNC: 9.8 MG/DL (ref 8.6–10.5)
CHLORIDE SERPL-SCNC: 103 MMOL/L (ref 98–107)
CHOLEST SERPL-MCNC: 116 MG/DL (ref 0–200)
CO2 SERPL-SCNC: 24.3 MMOL/L (ref 22–29)
CREAT SERPL-MCNC: 0.92 MG/DL (ref 0.57–1)
DEPRECATED RDW RBC AUTO: 43.9 FL (ref 37–54)
EGFRCR SERPLBLD CKD-EPI 2021: 67.5 ML/MIN/1.73
EOSINOPHIL # BLD AUTO: 0.12 10*3/MM3 (ref 0–0.4)
EOSINOPHIL NFR BLD AUTO: 1.7 % (ref 0.3–6.2)
ERYTHROCYTE [DISTWIDTH] IN BLOOD BY AUTOMATED COUNT: 13.8 % (ref 12.3–15.4)
GLOBULIN UR ELPH-MCNC: 2.4 GM/DL
GLUCOSE SERPL-MCNC: 146 MG/DL (ref 65–99)
HBA1C MFR BLD: 7.2 % (ref 4.8–5.6)
HCT VFR BLD AUTO: 42.9 % (ref 34–46.6)
HDLC SERPL-MCNC: 35 MG/DL (ref 40–60)
HGB BLD-MCNC: 13.9 G/DL (ref 12–15.9)
IMM GRANULOCYTES # BLD AUTO: 0.02 10*3/MM3 (ref 0–0.05)
IMM GRANULOCYTES NFR BLD AUTO: 0.3 % (ref 0–0.5)
LDLC SERPL CALC-MCNC: 54 MG/DL (ref 0–100)
LDLC/HDLC SERPL: 1.39 {RATIO}
LYMPHOCYTES # BLD AUTO: 2.28 10*3/MM3 (ref 0.7–3.1)
LYMPHOCYTES NFR BLD AUTO: 32.4 % (ref 19.6–45.3)
MAGNESIUM SERPL-MCNC: 1.7 MG/DL (ref 1.6–2.4)
MAXIMAL PREDICTED HEART RATE: 151 BPM
MCH RBC QN AUTO: 28.4 PG (ref 26.6–33)
MCHC RBC AUTO-ENTMCNC: 32.4 G/DL (ref 31.5–35.7)
MCV RBC AUTO: 87.6 FL (ref 79–97)
MONOCYTES # BLD AUTO: 0.79 10*3/MM3 (ref 0.1–0.9)
MONOCYTES NFR BLD AUTO: 11.2 % (ref 5–12)
NEUTROPHILS NFR BLD AUTO: 3.81 10*3/MM3 (ref 1.7–7)
NEUTROPHILS NFR BLD AUTO: 54.1 % (ref 42.7–76)
NRBC BLD AUTO-RTO: 0 /100 WBC (ref 0–0.2)
NT-PROBNP SERPL-MCNC: 153.5 PG/ML (ref 0–900)
PLATELET # BLD AUTO: 285 10*3/MM3 (ref 140–450)
PMV BLD AUTO: 10.7 FL (ref 6–12)
POTASSIUM SERPL-SCNC: 4.1 MMOL/L (ref 3.5–5.2)
PROT SERPL-MCNC: 6.5 G/DL (ref 6–8.5)
RBC # BLD AUTO: 4.9 10*6/MM3 (ref 3.77–5.28)
SINUS: 2.6 CM
SODIUM SERPL-SCNC: 142 MMOL/L (ref 136–145)
STJ: 2.29 CM
STRESS TARGET HR: 128 BPM
TRIGL SERPL-MCNC: 161 MG/DL (ref 0–150)
TSH SERPL DL<=0.05 MIU/L-ACNC: 2.91 UIU/ML (ref 0.27–4.2)
VLDLC SERPL-MCNC: 27 MG/DL (ref 5–40)
WBC NRBC COR # BLD: 7.04 10*3/MM3 (ref 3.4–10.8)

## 2022-07-12 PROCEDURE — 83036 HEMOGLOBIN GLYCOSYLATED A1C: CPT

## 2022-07-12 PROCEDURE — 83880 ASSAY OF NATRIURETIC PEPTIDE: CPT

## 2022-07-12 PROCEDURE — 85025 COMPLETE CBC W/AUTO DIFF WBC: CPT

## 2022-07-12 PROCEDURE — 93306 TTE W/DOPPLER COMPLETE: CPT

## 2022-07-12 PROCEDURE — 80053 COMPREHEN METABOLIC PANEL: CPT

## 2022-07-12 PROCEDURE — 83735 ASSAY OF MAGNESIUM: CPT

## 2022-07-12 PROCEDURE — 84443 ASSAY THYROID STIM HORMONE: CPT

## 2022-07-12 PROCEDURE — 36415 COLL VENOUS BLD VENIPUNCTURE: CPT

## 2022-07-12 PROCEDURE — 80061 LIPID PANEL: CPT

## 2022-07-12 PROCEDURE — 93306 TTE W/DOPPLER COMPLETE: CPT | Performed by: INTERNAL MEDICINE

## 2022-07-27 ENCOUNTER — OFFICE VISIT (OUTPATIENT)
Dept: CARDIOLOGY | Facility: CLINIC | Age: 72
End: 2022-07-27

## 2022-07-27 ENCOUNTER — APPOINTMENT (OUTPATIENT)
Dept: CARDIOLOGY | Facility: HOSPITAL | Age: 72
End: 2022-07-27

## 2022-07-27 DIAGNOSIS — R00.2 PALPITATIONS: ICD-10-CM

## 2022-07-27 DIAGNOSIS — I49.5 SSS (SICK SINUS SYNDROME): Primary | ICD-10-CM

## 2022-07-27 PROCEDURE — 93280 PM DEVICE PROGR EVAL DUAL: CPT | Performed by: INTERNAL MEDICINE

## 2023-01-17 ENCOUNTER — OFFICE VISIT (OUTPATIENT)
Dept: CARDIOLOGY | Facility: CLINIC | Age: 73
End: 2023-01-17
Payer: MEDICARE

## 2023-01-17 VITALS
WEIGHT: 192.8 LBS | HEART RATE: 76 BPM | SYSTOLIC BLOOD PRESSURE: 140 MMHG | HEIGHT: 63 IN | DIASTOLIC BLOOD PRESSURE: 90 MMHG | BODY MASS INDEX: 34.16 KG/M2

## 2023-01-17 DIAGNOSIS — I10 ESSENTIAL HYPERTENSION: ICD-10-CM

## 2023-01-17 DIAGNOSIS — I49.3 PVC (PREMATURE VENTRICULAR CONTRACTION): ICD-10-CM

## 2023-01-17 PROCEDURE — 99214 OFFICE O/P EST MOD 30 MIN: CPT | Performed by: NURSE PRACTITIONER

## 2023-01-17 PROCEDURE — 93000 ELECTROCARDIOGRAM COMPLETE: CPT | Performed by: NURSE PRACTITIONER

## 2023-01-17 RX ORDER — AMLODIPINE BESYLATE 2.5 MG/1
2.5 TABLET ORAL DAILY
Qty: 90 TABLET | Refills: 3 | Status: SHIPPED | OUTPATIENT
Start: 2023-01-17 | End: 2023-03-22 | Stop reason: SDUPTHER

## 2023-01-17 RX ORDER — METOPROLOL TARTRATE 50 MG/1
50 TABLET, FILM COATED ORAL 2 TIMES DAILY
Qty: 180 TABLET | Refills: 3 | Status: SHIPPED | OUTPATIENT
Start: 2023-01-17 | End: 2023-03-22

## 2023-01-17 RX ORDER — MAGNESIUM OXIDE 400 MG/1
400 TABLET ORAL DAILY
Qty: 90 TABLET | Refills: 1 | Status: SHIPPED | OUTPATIENT
Start: 2023-01-17

## 2023-01-17 RX ORDER — METFORMIN HYDROCHLORIDE 500 MG/1
500 TABLET, EXTENDED RELEASE ORAL
COMMUNITY

## 2023-01-17 NOTE — PROGRESS NOTES
Chief Complaint   Patient presents with   • Follow-up     Cardiac management   • Pacemaker Check     Last Medtronic PPM interrogation 7/22/22.   • Lab     Last labs a week ago per PCP.   • Shortness of Breath     Same as before, no worsening. She reports having sleep apnea, could not wear CPAP.   • Med Refill     Needs refills on Metoprolol tartrate-90 day. PCP refills all other medications.     Subjective       Brandie Nesbitt is a 72 y.o. female with HTN, hyperlipidemia, PVCs and SSS diagnosed in 2014 when she had a Medtronic pacemaker placed in [place]. She established care in January 2018 after moving to [place]. EKG at consult showed atrial pacing, occasional PVC. Lopressor 25 mg BID added.  Echo stable.  In January 2020, she reported increasing HOYOS. Stress test and echocardiogram showed normal LVEF, no ischemia.  Carotid US showed 50% LICA, normal right. Repeated 5/19/2021 remained stable.  Labs 7/12/2022 , , HDL 35, LDL 54, A1c 7.2%. Medtronic device check 7/22/22 showed 99% atrial paced, 2.5 yrs battery.     She returns today for follow up. She was diagnosed with sleep apnea but unable to tolerate CPAP. Labs per PCP last week.  She denies chest pain.  Shortness of breath is unchanged.  Denies dizziness, TIA.          Cardiac History:    Past Surgical History:   Procedure Laterality Date   • CARDIOVASCULAR STRESS TEST  01/08/2020    7 Min. 7. METS. 89% THR. BP- 158/84. EF > 70%. Breast Attenuation.   • ECHO - CONVERTED  01/04/2018    EF 65%. RVSP- 31 mmHg   • ECHO - CONVERTED  01/08/2020    EF 60%. LA- 3.9 Cm. Mild- Mod MR. RVSP- 34 mmHg.   • ECHO - CONVERTED  12/30/2021    EF 60%. LA- 4.2 Cm. Mild MR. RVSP- 42 mmHg   • ECHO - CONVERTED  07/12/2022    EF 65%. LA- 4.0. Trace-Mild MR. RVSP- 28 mmHg   • PACEMAKER IMPLANTATION  11/26/2014    Medtronic @ [facility]   • US CAROTID UNILATERAL  01/02/2020    50% on the left, none on right   • US CAROTID UNILATERAL  05/19/2021    <50% bilaterally      Current Outpatient Medications   Medication Sig Dispense Refill   • aspirin 81 MG EC tablet Take 81 mg by mouth Daily.     • atorvastatin (LIPITOR) 10 MG tablet Take 1 tablet by mouth Daily. 90 tablet 3   • cyanocobalamin (VITAMIN B-12) 2000 MCG tablet Take 2,000 mcg by mouth Daily.     • hydroCHLOROthiazide (HYDRODIURIL) 25 MG tablet Take 1 tablet by mouth Daily. 90 tablet 3   • losartan (COZAAR) 100 MG tablet Take 1 tablet by mouth Daily. 90 tablet 3   • meloxicam (MOBIC) 15 MG tablet Take 15 mg by mouth Daily As Needed.     • metFORMIN ER (GLUCOPHAGE-XR) 500 MG 24 hr tablet Take 500 mg by mouth Daily With Breakfast.     • metoprolol tartrate (LOPRESSOR) 50 MG tablet Take 1 tablet by mouth 2 (Two) Times a Day. 180 tablet 3   • amLODIPine (NORVASC) 2.5 MG tablet Take 1 tablet by mouth Daily. 90 tablet 3   • magnesium oxide (MAG-OX) 400 MG tablet Take 1 tablet by mouth Daily. 90 tablet 1     No current facility-administered medications for this visit.     Patient has no known allergies.    Past Medical History:   Diagnosis Date   • Breast abrasion    • Diabetes mellitus (HCC)    • History of kidney stones    • History of lumpectomy    • History of tonsillectomy    • Hypertension    • Pacemaker     Medtronic   • Sleep apnea    • Tennis elbow      Social History     Socioeconomic History   • Marital status: Unknown   Tobacco Use   • Smoking status: Never   • Smokeless tobacco: Never   Vaping Use   • Vaping Use: Never used   Substance and Sexual Activity   • Alcohol use: Yes     Comment: rarely   • Drug use: No   • Sexual activity: Defer     Family History   Problem Relation Age of Onset   • Heart attack Mother    • Hypertension Mother    • Hyperlipidemia Mother    • Cancer Mother    • Stroke Father    • Hyperlipidemia Father    • Hypertension Father    • Cancer Father    • Heart disease Sister    • Diabetes Sister    • Stroke Sister    • Heart disease Brother    • Diabetes Brother    • Stroke Brother    • Stroke  "Maternal Grandmother    • Heart attack Maternal Grandfather    • Heart attack Paternal Grandmother    • Stroke Paternal Grandmother    • Heart attack Paternal Grandfather    • Stroke Paternal Grandfather      Review of Systems   Constitutional: Positive for weight loss (-6). Negative for decreased appetite and malaise/fatigue.   HENT: Negative.    Eyes: Negative for blurred vision.   Cardiovascular: Negative for chest pain, dyspnea on exertion, leg swelling, palpitations and syncope.   Respiratory: Negative for shortness of breath and sleep disturbances due to breathing.    Endocrine: Negative.    Hematologic/Lymphatic: Negative for bleeding problem. Does not bruise/bleed easily.   Skin: Negative.    Musculoskeletal: Negative for falls and myalgias.   Gastrointestinal: Negative for abdominal pain, heartburn and melena.   Genitourinary: Negative for hematuria.   Neurological: Negative for dizziness and light-headedness.   Psychiatric/Behavioral: Negative for altered mental status.   Allergic/Immunologic: Negative.       Objective     /90 (BP Location: Right arm)   Pulse 76   Ht 160 cm (62.99\")   Wt 87.5 kg (192 lb 12.8 oz)   BMI 34.16 kg/m²     Vitals and nursing note reviewed.   Constitutional:       General: Not in acute distress.     Appearance: Well-developed. Not diaphoretic.   Eyes:      Pupils: Pupils are equal, round, and reactive to light.   HENT:      Head: Normocephalic.   Pulmonary:      Effort: Pulmonary effort is normal. No respiratory distress.      Breath sounds: Normal breath sounds.   Cardiovascular:      Normal rate. Regular rhythm.   Pulses:     Intact distal pulses.   Edema:     Peripheral edema absent.   Abdominal:      General: Bowel sounds are normal.      Palpations: Abdomen is soft.   Musculoskeletal: Normal range of motion.      Cervical back: Normal range of motion. Skin:     General: Skin is warm and dry.   Neurological:      Mental Status: Alert and oriented to person, place, " and time.          ECG 12 Lead    Date/Time: 1/17/2023 9:11 AM  Performed by: Alisa Morse APRN  Authorized by: Alisa Morse APRN   Comparison: compared with previous ECG   Similar to previous ECG  Rhythm: paced  Ectopy: unifocal PVCs  Rate: normal  BPM: 76  ST Segments: ST segments normal    Clinical impression: abnormal EKG                Problem List Items Addressed This Visit        Cardiac and Vasculature    Essential hypertension    Relevant Medications    amLODIPine (NORVASC) 2.5 MG tablet    metoprolol tartrate (LOPRESSOR) 50 MG tablet    PVC (premature ventricular contraction)    Relevant Medications    amLODIPine (NORVASC) 2.5 MG tablet    metoprolol tartrate (LOPRESSOR) 50 MG tablet    Other Relevant Orders    ECG 12 Lead      1. HTN- elevated today at 140/90. Add amlodipine 2.5 mg daily. May increase to 5 mg to achieve bp <130/80 in a diabetic patient. Continue metoprolol, losartan, HCTZ. Limit Na. Continue weight loss efforts.     2. YVES- uncorrected as she does not tolerate CPAP. Side lying sleep, weight loss.     3. SSS- EKG today showed atrial pacing with intermittent PVC. Check Medtronic device.     4.  PVC- multiple, add magnesium 400 mg daily for mag 1.7. Did not tolerate higher dose metoprolol due to hair loss.     5. Hyperlipidemia- Continue Lipitor, LDL at goal.     6. Diabetes- A1C remains slightly above goal. Continue low sugar, low carbohydrate diet.     Further recommendations to follow.    FU 6 months or sooner if needed.     Class 2 Severe Obesity (BMI >=35 and <=39.9). Obesity-related health conditions include the following: obstructive sleep apnea, hypertension, coronary heart disease, diabetes mellitus, dyslipidemias and GERD. Obesity is improving with lifestyle modifications. BMI is is above average; BMI management plan is completed. We discussed portion control and increasing exercise.                 Electronically signed by TROY Bonilla,  January 17, 2023 09:15 EST

## 2023-01-17 NOTE — LETTER
January 17, 2023     TROY Maher  [address]  [address] [address]    Patient: Brandie Nesbitt   YOB: 1950   Date of Visit: 1/17/2023       Dear TROY Maher    Brandie Nesbitt was in my office today. Below is a copy of my note.    If you have questions, please do not hesitate to call me. I look forward to following Brandie along with you.         Sincerely,        TROY Bonilla        CC: No Recipients    Chief Complaint   Patient presents with   • Follow-up     Cardiac management   • Pacemaker Check     Last Medtronic PPM interrogation 7/22/22.   • Lab     Last labs a week ago per PCP.   • Shortness of Breath     Same as before, no worsening. She reports having sleep apnea, could not wear CPAP.   • Med Refill     Needs refills on Metoprolol tartrate-90 day. PCP refills all other medications.     Subjective       Brandie Nesbitt is a 72 y.o. female with HTN, hyperlipidemia, PVCs and SSS diagnosed in 2014 when she had a Medtronic pacemaker placed in [place]. She established care in January 2018 after moving to [place]. EKG at consult showed atrial pacing, occasional PVC. Lopressor 25 mg BID added.  Echo stable.  In January 2020, she reported increasing HOYOS. Stress test and echocardiogram showed normal LVEF, no ischemia.  Carotid US showed 50% LICA, normal right. Repeated 5/19/2021 remained stable.  Labs 7/12/2022 , , HDL 35, LDL 54, A1c 7.2%. Medtronic device check 7/22/22 showed 99% atrial paced, 2.5 yrs battery.     She returns today for follow up. She was diagnosed with sleep apnea but unable to tolerate CPAP. Labs per PCP last week.  She denies chest pain.  Shortness of breath is unchanged.  Denies dizziness, TIA.         Cardiac History:    Past Surgical History:   Procedure Laterality Date   • CARDIOVASCULAR STRESS TEST  01/08/2020    7 Min. 7. METS. 89% THR. BP- 158/84. EF > 70%. Breast Attenuation.    • ECHO - CONVERTED  01/04/2018    EF 65%. RVSP- 31 mmHg   • ECHO - CONVERTED  01/08/2020    EF 60%. LA- 3.9 Cm. Mild- Mod MR. RVSP- 34 mmHg.   • ECHO - CONVERTED  12/30/2021    EF 60%. LA- 4.2 Cm. Mild MR. RVSP- 42 mmHg   • ECHO - CONVERTED  07/12/2022    EF 65%. LA- 4.0. Trace-Mild MR. RVSP- 28 mmHg   • PACEMAKER IMPLANTATION  11/26/2014    Medtronic @ [facility]   • US CAROTID UNILATERAL  01/02/2020    50% on the left, none on right   • US CAROTID UNILATERAL  05/19/2021    <50% bilaterally     Current Outpatient Medications   Medication Sig Dispense Refill   • aspirin 81 MG EC tablet Take 81 mg by mouth Daily.     • atorvastatin (LIPITOR) 10 MG tablet Take 1 tablet by mouth Daily. 90 tablet 3   • cyanocobalamin (VITAMIN B-12) 2000 MCG tablet Take 2,000 mcg by mouth Daily.     • hydroCHLOROthiazide (HYDRODIURIL) 25 MG tablet Take 1 tablet by mouth Daily. 90 tablet 3   • losartan (COZAAR) 100 MG tablet Take 1 tablet by mouth Daily. 90 tablet 3   • meloxicam (MOBIC) 15 MG tablet Take 15 mg by mouth Daily As Needed.     • metFORMIN ER (GLUCOPHAGE-XR) 500 MG 24 hr tablet Take 500 mg by mouth Daily With Breakfast.     • metoprolol tartrate (LOPRESSOR) 50 MG tablet Take 1 tablet by mouth 2 (Two) Times a Day. 180 tablet 3   • amLODIPine (NORVASC) 2.5 MG tablet Take 1 tablet by mouth Daily. 90 tablet 3   • magnesium oxide (MAG-OX) 400 MG tablet Take 1 tablet by mouth Daily. 90 tablet 1     No current facility-administered medications for this visit.     Patient has no known allergies.    Past Medical History:   Diagnosis Date   • Breast abrasion    • Diabetes mellitus (HCC)    • History of kidney stones    • History of lumpectomy    • History of tonsillectomy    • Hypertension    • Pacemaker     Medtronic   • Sleep apnea    • Tennis elbow      Social History     Socioeconomic History   • Marital status: Unknown   Tobacco Use   • Smoking status: Never   • Smokeless tobacco: Never   Vaping Use   • Vaping Use: Never  "used   Substance and Sexual Activity   • Alcohol use: Yes     Comment: rarely   • Drug use: No   • Sexual activity: Defer     Family History   Problem Relation Age of Onset   • Heart attack Mother    • Hypertension Mother    • Hyperlipidemia Mother    • Cancer Mother    • Stroke Father    • Hyperlipidemia Father    • Hypertension Father    • Cancer Father    • Heart disease Sister    • Diabetes Sister    • Stroke Sister    • Heart disease Brother    • Diabetes Brother    • Stroke Brother    • Stroke Maternal Grandmother    • Heart attack Maternal Grandfather    • Heart attack Paternal Grandmother    • Stroke Paternal Grandmother    • Heart attack Paternal Grandfather    • Stroke Paternal Grandfather      Review of Systems   Constitutional: Positive for weight loss (-6). Negative for decreased appetite and malaise/fatigue.   HENT: Negative.    Eyes: Negative for blurred vision.   Cardiovascular: Negative for chest pain, dyspnea on exertion, leg swelling, palpitations and syncope.   Respiratory: Negative for shortness of breath and sleep disturbances due to breathing.    Endocrine: Negative.    Hematologic/Lymphatic: Negative for bleeding problem. Does not bruise/bleed easily.   Skin: Negative.    Musculoskeletal: Negative for falls and myalgias.   Gastrointestinal: Negative for abdominal pain, heartburn and melena.   Genitourinary: Negative for hematuria.   Neurological: Negative for dizziness and light-headedness.   Psychiatric/Behavioral: Negative for altered mental status.   Allergic/Immunologic: Negative.       Objective      /90 (BP Location: Right arm)   Pulse 76   Ht 160 cm (62.99\")   Wt 87.5 kg (192 lb 12.8 oz)   BMI 34.16 kg/m²     Vitals and nursing note reviewed.   Constitutional:       General: Not in acute distress.     Appearance: Well-developed. Not diaphoretic.   Eyes:      Pupils: Pupils are equal, round, and reactive to light.   HENT:      Head: Normocephalic.   Pulmonary:      Effort: " Pulmonary effort is normal. No respiratory distress.      Breath sounds: Normal breath sounds.   Cardiovascular:      Normal rate. Regular rhythm.   Pulses:     Intact distal pulses.   Edema:     Peripheral edema absent.   Abdominal:      General: Bowel sounds are normal.      Palpations: Abdomen is soft.   Musculoskeletal: Normal range of motion.      Cervical back: Normal range of motion. Skin:     General: Skin is warm and dry.   Neurological:      Mental Status: Alert and oriented to person, place, and time.          ECG 12 Lead    Date/Time: 1/17/2023 9:11 AM  Performed by: Alisa Morse APRN  Authorized by: Alisa Morse APRN   Comparison: compared with previous ECG   Similar to previous ECG  Rhythm: paced  Ectopy: unifocal PVCs  Rate: normal  BPM: 76  ST Segments: ST segments normal    Clinical impression: abnormal EKG               Problem List Items Addressed This Visit        Cardiac and Vasculature    Essential hypertension    Relevant Medications    amLODIPine (NORVASC) 2.5 MG tablet    metoprolol tartrate (LOPRESSOR) 50 MG tablet    PVC (premature ventricular contraction)    Relevant Medications    amLODIPine (NORVASC) 2.5 MG tablet    metoprolol tartrate (LOPRESSOR) 50 MG tablet    Other Relevant Orders    ECG 12 Lead      1. HTN- elevated today at 140/90. Add amlodipine 2.5 mg daily. May increase to 5 mg to achieve bp <130/80 in a diabetic patient. Continue metoprolol, losartan, HCTZ. Limit Na. Continue weight loss efforts.     2. YVES- uncorrected as she does not tolerate CPAP. Side lying sleep, weight loss.     3. SSS- EKG today showed atrial pacing with intermittent PVC. Check Medtronic device.     4.  PVC- multiple, add magnesium 400 mg daily for mag 1.7. Did not tolerate higher dose metoprolol due to hair loss.     5. Hyperlipidemia- Continue Lipitor, LDL at goal.     6. Diabetes- A1C remains slightly above goal. Continue low sugar, low carbohydrate diet.     Further recommendations to  follow.    FU 6 months or sooner if needed.     Class 2 Severe Obesity (BMI >=35 and <=39.9). Obesity-related health conditions include the following: obstructive sleep apnea, hypertension, coronary heart disease, diabetes mellitus, dyslipidemias and GERD. Obesity is improving with lifestyle modifications. BMI is is above average; BMI management plan is completed. We discussed portion control and increasing exercise.                Electronically signed by TROY Bonilla,  January 17, 2023 09:15 EST

## 2023-02-03 ENCOUNTER — OUTSIDE FACILITY SERVICE (OUTPATIENT)
Dept: CARDIOLOGY | Facility: CLINIC | Age: 73
End: 2023-02-03
Payer: MEDICARE

## 2023-02-03 PROCEDURE — 78452 HT MUSCLE IMAGE SPECT MULT: CPT | Performed by: INTERNAL MEDICINE

## 2023-02-03 PROCEDURE — 93306 TTE W/DOPPLER COMPLETE: CPT | Performed by: INTERNAL MEDICINE

## 2023-02-03 PROCEDURE — 93018 CV STRESS TEST I&R ONLY: CPT | Performed by: INTERNAL MEDICINE

## 2023-02-03 PROCEDURE — 99215 OFFICE O/P EST HI 40 MIN: CPT | Performed by: INTERNAL MEDICINE

## 2023-02-07 ENCOUNTER — TELEPHONE (OUTPATIENT)
Dept: CARDIOLOGY | Facility: CLINIC | Age: 73
End: 2023-02-07
Payer: MEDICARE

## 2023-02-07 NOTE — TELEPHONE ENCOUNTER
Patient was discharged from the hosp and said she was supposed to follow up in a couple days with you. When do you want her to follow up? Thank you.

## 2023-02-08 NOTE — TELEPHONE ENCOUNTER
Called and spoke with patient we scheduled her for an EKG for 02/09/2023 @ 9:00 am (coming in at 8:00) and a hosp follow up 03/14/2023 @ 3:00 with Zelda CRUZ

## 2023-02-09 ENCOUNTER — CLINICAL SUPPORT (OUTPATIENT)
Dept: CARDIOLOGY | Facility: CLINIC | Age: 73
End: 2023-02-09
Payer: MEDICARE

## 2023-02-09 DIAGNOSIS — I49.5 SSS (SICK SINUS SYNDROME): Primary | ICD-10-CM

## 2023-02-09 DIAGNOSIS — R00.2 PALPITATIONS: ICD-10-CM

## 2023-02-09 PROCEDURE — 93000 ELECTROCARDIOGRAM COMPLETE: CPT | Performed by: INTERNAL MEDICINE

## 2023-02-09 NOTE — PROGRESS NOTES
Procedure     ECG 12 Lead    Date/Time: 2/9/2023 10:08 AM  Performed by: Estrellita Weldon MD  Authorized by: Estrellita Weldon MD   Comparison: compared with previous ECG from 1/17/2023  Comparison to previous ECG: No PVC  Rhythm: paced  Rate: normal  Q waves: V1 and V2    QRS axis: normal    Clinical impression: abnormal EKG

## 2023-02-10 ENCOUNTER — TELEPHONE (OUTPATIENT)
Dept: CARDIOLOGY | Facility: CLINIC | Age: 73
End: 2023-02-10
Payer: MEDICARE

## 2023-02-10 NOTE — TELEPHONE ENCOUNTER
Patient was made aware per Dr. Weldon per EKG on 02/09/23 to continue flecainide 50 mg BID and other medications the same. Patient also asked for results of stress and echo that were done at [facility] and those were reviewed.

## 2023-02-27 ENCOUNTER — TELEPHONE (OUTPATIENT)
Dept: CARDIOLOGY | Facility: CLINIC | Age: 73
End: 2023-02-27
Payer: MEDICARE

## 2023-02-27 ENCOUNTER — TELEPHONE (OUTPATIENT)
Dept: CARDIOLOGY | Facility: CLINIC | Age: 73
End: 2023-02-27

## 2023-02-27 DIAGNOSIS — R00.2 PALPITATIONS: ICD-10-CM

## 2023-02-27 DIAGNOSIS — I49.5 SSS (SICK SINUS SYNDROME): Primary | ICD-10-CM

## 2023-02-27 RX ORDER — FLECAINIDE ACETATE 50 MG/1
50 TABLET ORAL EVERY 12 HOURS
COMMUNITY
End: 2023-02-27 | Stop reason: SDUPTHER

## 2023-02-27 RX ORDER — FLECAINIDE ACETATE 50 MG/1
50 TABLET ORAL EVERY 12 HOURS
Qty: 180 TABLET | Refills: 2 | Status: SHIPPED | OUTPATIENT
Start: 2023-02-27 | End: 2023-03-22 | Stop reason: SDUPTHER

## 2023-02-27 NOTE — TELEPHONE ENCOUNTER
Caller: Brandie Nesbitt    Relationship to patient: Self    Best call back number: [phone]    Chief complaint: SCHEDULING CONFLICT    Type of visit: HOSPITAL F/U    Requested date: 3.22.23    If rescheduling, when is the original appointment: 3.14.23    Additional notes:PATIENT IS SCHEDULED FOR A PACER CHECK AND DOES NOT WANT TO MAKE 2 TRIPS. PLEASE ADVISE, THANK YOU.

## 2023-02-27 NOTE — TELEPHONE ENCOUNTER
Appt made with [employer] on 3/22/23. Pt will follow up day before to see if any appt times are available close to pacer check.

## 2023-02-27 NOTE — TELEPHONE ENCOUNTER
Caller: Brandie Nesbitt    Relationship: Self    Best call back number: [phone]    Requested Prescriptions:   Requested Prescriptions      No prescriptions requested or ordered in this encounter        Pharmacy where request should be sent: [facility][id number] - [place] [address]  [facility] [address] - [phone] [facility] [phone] FX     Additional details provided by patient: PT WAS PUT ON FUROSEMIDE TWICE DAILY WHILE IN [facility]. HUB WAS UNABLE TO FIND MEDICATION ON LIST. PLEASE ADVISE. PT HAS 1 WEEK LEFT OF THIS MEDICATION, THANK YOU.    Does the patient have less than a 3 day supply:  [] Yes  [x] No

## 2023-03-21 NOTE — PROGRESS NOTES
Chief Complaint   Patient presents with   • Follow-up     Pt is here for cardiac follow up.  Pt states she has SOB, no better or worse since LOV.  She denies CP, dizziness or palpitations.  Pt does take a daily ASA     • Med Refill     Pt request 90 day refills to be sent to [facility].  Medications were verified by the pt.     • Lab Work     Pt states their last labs were a little over a month ago with her PCP.         Cardiac Complaints  dyspnea      Subjective   Brandie Nesbitt is a 72 y.o. female HTN, hyperlipidemia, PVCs and SSS diagnosed in 2014 when she had a Medtronic pacemaker placed in [place]. She established care in January 2018 after moving to [place]. EKG at consult showed atrial pacing, occasional PVC. Lopressor 25 mg BID added.  Echo stable.  In January 2020, she reported increasing HOYOS. Stress test and echocardiogram showed normal LVEF, no ischemia.  Carotid US showed 50% LICA, normal right. Repeated 5/19/2021 remained stable.    She then went to the hospital after COVID infection in Feb 2023. She reported they told her at work her heart rate was low, despite having a pacemaker, noted to have PVCs. She was noted to be mildly dehydrated at [facility] ER and received fluids and felt better after. Stress and echo were advised. Stress 2/2023 negative for ischemia with good LV function noted. Echo showed no significant valve concerns. Flecainide added for PVC management.  EKG done after addition showed atrial pacing and no PVCs. Labs done 2/3/2023 showed: Na 140, K 4.3, Creatinine 0.98, BUN 22, Mag 2.0, TSH 3.5, HH 14.1/42.9.     She comes today for follow up with SOA similar to prior. She denies any CP, palpitations, dizziness, and syncope. She admits to feeling well since being in the hospital. Labs were done with PCP about a month ago, to be done again with her in about 6 months. Refills are requested.             Cardiac History  Past Surgical History:   Procedure Laterality Date   • CARDIOVASCULAR STRESS  TEST  01/08/2020    7 Min. 7. METS. 89% THR. BP- 158/84. EF > 70%. Breast Attenuation.   • CARDIOVASCULAR STRESS TEST  02/03/2023    EF 59%, no ischemia   • ECHO - CONVERTED  01/04/2018    EF 65%. RVSP- 31 mmHg   • ECHO - CONVERTED  01/08/2020    EF 60%. LA- 3.9 Cm. Mild- Mod MR. RVSP- 34 mmHg.   • ECHO - CONVERTED  12/30/2021    EF 60%. LA- 4.2 Cm. Mild MR. RVSP- 42 mmHg   • ECHO - CONVERTED  07/12/2022    EF 65%. LA- 4.0. Trace-Mild MR. RVSP- 28 mmHg   • ECHO - CONVERTED  02/03/2023    EF 60%, LVH, PVCs, RVSP 47mmHg, diastolic dysfunction   • PACEMAKER IMPLANTATION  11/26/2014    Medtronic @ [facility]   • US CAROTID UNILATERAL  01/02/2020    50% on the left, none on right   • US CAROTID UNILATERAL  05/19/2021    <50% bilaterally       Current Outpatient Medications   Medication Sig Dispense Refill   • amLODIPine (NORVASC) 2.5 MG tablet Take 1 tablet by mouth Daily. 90 tablet 3   • aspirin 81 MG EC tablet Take 1 tablet by mouth Daily.     • atorvastatin (LIPITOR) 10 MG tablet Take 1 tablet by mouth Daily. 90 tablet 3   • cyanocobalamin (VITAMIN B-12) 2000 MCG tablet Take 1 tablet by mouth Daily.     • flecainide (TAMBOCOR) 50 MG tablet Take 1 tablet by mouth Every 12 (Twelve) Hours. 60 tablet 0   • hydroCHLOROthiazide (HYDRODIURIL) 25 MG tablet Take 1 tablet by mouth Daily. 90 tablet 3   • losartan (COZAAR) 100 MG tablet Take 1 tablet by mouth Daily. 90 tablet 3   • magnesium oxide (MAG-OX) 400 MG tablet Take 1 tablet by mouth Daily. 90 tablet 1   • meloxicam (MOBIC) 15 MG tablet Take 1 tablet by mouth Daily As Needed.     • metFORMIN ER (GLUCOPHAGE-XR) 500 MG 24 hr tablet Take 1 tablet by mouth Daily With Breakfast.     • metoprolol tartrate (LOPRESSOR) 25 MG tablet Take 1 tablet by mouth 2 (Two) Times a Day. 60 tablet 0     No current facility-administered medications for this visit.       Patient has no known allergies.    Past Medical History:   Diagnosis Date   • Breast abrasion    • Diabetes mellitus  "(HCC)    • History of kidney stones    • History of lumpectomy    • History of tonsillectomy    • Hypertension    • Pacemaker     Medtronic   • Sleep apnea    • Tennis elbow        Social History     Socioeconomic History   • Marital status: Unknown   Tobacco Use   • Smoking status: Never   • Smokeless tobacco: Never   Vaping Use   • Vaping Use: Never used   Substance and Sexual Activity   • Alcohol use: Yes     Comment: rarely   • Drug use: No   • Sexual activity: Defer       Family History   Problem Relation Age of Onset   • Heart attack Mother    • Hypertension Mother    • Hyperlipidemia Mother    • Cancer Mother    • Stroke Father    • Hyperlipidemia Father    • Hypertension Father    • Cancer Father    • Heart disease Sister    • Diabetes Sister    • Stroke Sister    • Heart disease Brother    • Diabetes Brother    • Stroke Brother    • Stroke Maternal Grandmother    • Heart attack Maternal Grandfather    • Heart attack Paternal Grandmother    • Stroke Paternal Grandmother    • Heart attack Paternal Grandfather    • Stroke Paternal Grandfather        Review of Systems   Constitutional: Negative for malaise/fatigue and night sweats.   Cardiovascular: Positive for dyspnea on exertion. Negative for chest pain, claudication, irregular heartbeat, leg swelling, near-syncope, orthopnea, palpitations and syncope.   Respiratory: Positive for shortness of breath. Negative for cough and wheezing.    Musculoskeletal: Negative for back pain, joint pain and stiffness.   Gastrointestinal: Negative for anorexia, heartburn, nausea and vomiting.   Genitourinary: Negative for dysuria, hematuria, hesitancy and nocturia.   Neurological: Negative for dizziness, headaches and light-headedness.   Psychiatric/Behavioral: Negative for depression and memory loss. The patient is not nervous/anxious.            Objective     /90 (BP Location: Left arm, Patient Position: Sitting)   Pulse 72   Ht 157.5 cm (62\")   Wt 87 kg (191 lb " 12.8 oz)   BMI 35.08 kg/m²     Constitutional:       Appearance: Not in distress.   Eyes:      Pupils: Pupils are equal, round, and reactive to light.   HENT:      Nose: Nose normal.   Pulmonary:      Effort: Pulmonary effort is normal.      Breath sounds: Normal breath sounds.   Cardiovascular:      PMI at left midclavicular line. Normal rate. Regular rhythm.      Murmurs: There is a systolic murmur.   Abdominal:      Palpations: Abdomen is soft.   Musculoskeletal: Normal range of motion.      Cervical back: Normal range of motion and neck supple. Skin:     General: Skin is warm and dry.   Neurological:      Mental Status: Alert.           ECG 12 Lead    Date/Time: 3/22/2023 8:45 AM  Performed by: Michelle Ramos APRN  Authorized by: Michelle Ramos APRN   Comparison: compared with previous ECG from 2/9/2023  Comparison to previous ECG: No PVC  Rhythm: paced  BPM: 72    Clinical impression: abnormal EKG  Comments: Normal QT                 Diagnoses and all orders for this visit:    1. PVC (premature ventricular contraction) (Primary)  -     ECG 12 Lead    2. SSS (sick sinus syndrome) (HCC)  -     Discontinue: flecainide (TAMBOCOR) 50 MG tablet; Take 1 tablet by mouth Every 12 (Twelve) Hours.  Dispense: 180 tablet; Refill: 2  -     flecainide (TAMBOCOR) 50 MG tablet; Take 1 tablet by mouth Every 12 (Twelve) Hours.  Dispense: 60 tablet; Refill: 0  -     ECG 12 Lead    3. Palpitations  -     Discontinue: flecainide (TAMBOCOR) 50 MG tablet; Take 1 tablet by mouth Every 12 (Twelve) Hours.  Dispense: 180 tablet; Refill: 2  -     flecainide (TAMBOCOR) 50 MG tablet; Take 1 tablet by mouth Every 12 (Twelve) Hours.  Dispense: 60 tablet; Refill: 0    4. Essential hypertension  -     hydroCHLOROthiazide (HYDRODIURIL) 25 MG tablet; Take 1 tablet by mouth Daily.  Dispense: 90 tablet; Refill: 3    5. Hypercholesteremia  -     atorvastatin (LIPITOR) 10 MG tablet; Take 1 tablet by mouth Daily.  Dispense: 90 tablet; Refill:  3    6. Shortness of breath    7. Type 2 diabetes mellitus with other specified complication, without long-term current use of insulin (HCC)    8. Severe obesity (BMI 35.0-39.9) with comorbidity (HCC)    Other orders  -     Discontinue: metoprolol tartrate (LOPRESSOR) 25 MG tablet; Take 1 tablet by mouth 2 (Two) Times a Day.  Dispense: 180 tablet; Refill: 3  -     amLODIPine (NORVASC) 2.5 MG tablet; Take 1 tablet by mouth Daily.  Dispense: 90 tablet; Refill: 3  -     losartan (COZAAR) 100 MG tablet; Take 1 tablet by mouth Daily.  Dispense: 90 tablet; Refill: 3  -     metoprolol tartrate (LOPRESSOR) 25 MG tablet; Take 1 tablet by mouth 2 (Two) Times a Day.  Dispense: 60 tablet; Refill: 0             SSS/PVC: EKG done today in regards showed atrial pacing with normal QT. She will continue with flecainide therapy at current, no PVCs noted today. Will add back metoprolol at 25mg BID back to current. Will continue Mag at same.     HTN: BP is not at goal. Metoprolol to be added back, but at 25mg BID. She will continue HCTZ and cozaar at same. Limited sodium urged. Will monitor BP and HR with addition.     Hyperlipidemia: On statin therapy with lipitor. Tolerates well. FLP with your office. Can we have next for review? Limited carb diet urged.     Cardiac status: Most recent workup at [facility] neg for ischemia, good LV function, no sig valve concerns. ASA continued. Findings discussed.     DM: Taking gluophage therapy. Tolerates well. AIC with you. Limited carb diet urged.     Refills per request.    BMI noted at 35.08, good cardiac ADA diet urged with walking as tolerated advised.     6 month follow up recommended, or sooner if needed.           Problems Addressed this Visit        Cardiac and Vasculature    SSS (sick sinus syndrome) (Formerly [facility])    Relevant Medications    amLODIPine (NORVASC) 2.5 MG tablet    flecainide (TAMBOCOR) 50 MG tablet    metoprolol tartrate (LOPRESSOR) 25 MG tablet    Other Relevant Orders    ECG 12 Lead     Essential hypertension    Relevant Medications    amLODIPine (NORVASC) 2.5 MG tablet    hydroCHLOROthiazide (HYDRODIURIL) 25 MG tablet    losartan (COZAAR) 100 MG tablet    metoprolol tartrate (LOPRESSOR) 25 MG tablet    Hypercholesteremia    Relevant Medications    atorvastatin (LIPITOR) 10 MG tablet    Palpitations    Relevant Medications    flecainide (TAMBOCOR) 50 MG tablet    PVC (premature ventricular contraction) - Primary    Relevant Medications    amLODIPine (NORVASC) 2.5 MG tablet    metoprolol tartrate (LOPRESSOR) 25 MG tablet    Other Relevant Orders    ECG 12 Lead       Endocrine and Metabolic    Type 2 diabetes mellitus, without long-term current use of insulin (Formerly [facility])   Other Visit Diagnoses     Shortness of breath        Severe obesity (BMI 35.0-39.9) with comorbidity ([facility])          Diagnoses       Codes Comments    PVC (premature ventricular contraction)    -  Primary ICD-10-CM: I49.3  ICD-9-CM: 427.69     SSS (sick sinus syndrome) (Former[facility])     ICD-10-CM: I49.5  ICD-9-CM: 427.81     Palpitations     ICD-10-CM: R00.2  ICD-9-CM: 785.1     Essential hypertension     ICD-10-CM: I10  ICD-9-CM: 401.9     Hypercholesteremia     ICD-10-CM: E78.00  ICD-9-CM: 272.0     Shortness of breath     ICD-10-CM: R06.02  ICD-9-CM: 786.05     Type 2 diabetes mellitus with other specified complication, without long-term current use of insulin ([facility])     ICD-10-CM: E11.69  ICD-9-CM: 250.80     Severe obesity (BMI 35.0-39.9) with comorbidity ([facility])     ICD-10-CM: E66.01  ICD-9-CM: 278.01                     Electronically signed by TROY Moncada March 22, 2023 11:47 EDT

## 2023-03-22 ENCOUNTER — OFFICE VISIT (OUTPATIENT)
Dept: CARDIOLOGY | Facility: CLINIC | Age: 73
End: 2023-03-22
Payer: MEDICARE

## 2023-03-22 VITALS
BODY MASS INDEX: 35.3 KG/M2 | DIASTOLIC BLOOD PRESSURE: 90 MMHG | WEIGHT: 191.8 LBS | SYSTOLIC BLOOD PRESSURE: 148 MMHG | HEART RATE: 72 BPM | HEIGHT: 62 IN

## 2023-03-22 DIAGNOSIS — R06.02 SHORTNESS OF BREATH: ICD-10-CM

## 2023-03-22 DIAGNOSIS — E78.00 HYPERCHOLESTEREMIA: ICD-10-CM

## 2023-03-22 DIAGNOSIS — I49.3 PVC (PREMATURE VENTRICULAR CONTRACTION): Primary | ICD-10-CM

## 2023-03-22 DIAGNOSIS — I49.5 SSS (SICK SINUS SYNDROME): ICD-10-CM

## 2023-03-22 DIAGNOSIS — R00.2 PALPITATIONS: ICD-10-CM

## 2023-03-22 DIAGNOSIS — E66.01 SEVERE OBESITY (BMI 35.0-39.9) WITH COMORBIDITY: ICD-10-CM

## 2023-03-22 DIAGNOSIS — I10 ESSENTIAL HYPERTENSION: ICD-10-CM

## 2023-03-22 DIAGNOSIS — E11.69 TYPE 2 DIABETES MELLITUS WITH OTHER SPECIFIED COMPLICATION, WITHOUT LONG-TERM CURRENT USE OF INSULIN: ICD-10-CM

## 2023-03-22 DIAGNOSIS — I49.5 SSS (SICK SINUS SYNDROME): Primary | ICD-10-CM

## 2023-03-22 PROCEDURE — 3077F SYST BP >= 140 MM HG: CPT | Performed by: NURSE PRACTITIONER

## 2023-03-22 PROCEDURE — 3080F DIAST BP >= 90 MM HG: CPT | Performed by: NURSE PRACTITIONER

## 2023-03-22 PROCEDURE — 99214 OFFICE O/P EST MOD 30 MIN: CPT | Performed by: NURSE PRACTITIONER

## 2023-03-22 PROCEDURE — 93288 INTERROG EVL PM/LDLS PM IP: CPT | Performed by: INTERNAL MEDICINE

## 2023-03-22 PROCEDURE — 93000 ELECTROCARDIOGRAM COMPLETE: CPT | Performed by: NURSE PRACTITIONER

## 2023-03-22 RX ORDER — FLECAINIDE ACETATE 50 MG/1
50 TABLET ORAL EVERY 12 HOURS
Qty: 60 TABLET | Refills: 0 | Status: SHIPPED | OUTPATIENT
Start: 2023-03-22 | End: 2023-03-24 | Stop reason: SDUPTHER

## 2023-03-22 RX ORDER — HYDROCHLOROTHIAZIDE 25 MG/1
25 TABLET ORAL DAILY
Qty: 90 TABLET | Refills: 3 | Status: SHIPPED | OUTPATIENT
Start: 2023-03-22

## 2023-03-22 RX ORDER — FLECAINIDE ACETATE 50 MG/1
50 TABLET ORAL EVERY 12 HOURS
Qty: 180 TABLET | Refills: 2 | Status: SHIPPED | OUTPATIENT
Start: 2023-03-22 | End: 2023-03-22 | Stop reason: SDUPTHER

## 2023-03-22 RX ORDER — AMLODIPINE BESYLATE 2.5 MG/1
2.5 TABLET ORAL DAILY
Qty: 90 TABLET | Refills: 3 | Status: SHIPPED | OUTPATIENT
Start: 2023-03-22

## 2023-03-22 RX ORDER — ATORVASTATIN CALCIUM 10 MG/1
10 TABLET, FILM COATED ORAL DAILY
Qty: 90 TABLET | Refills: 3 | Status: SHIPPED | OUTPATIENT
Start: 2023-03-22

## 2023-03-22 RX ORDER — LOSARTAN POTASSIUM 100 MG/1
100 TABLET ORAL DAILY
Qty: 90 TABLET | Refills: 3 | Status: SHIPPED | OUTPATIENT
Start: 2023-03-22

## 2023-03-24 DIAGNOSIS — R00.2 PALPITATIONS: ICD-10-CM

## 2023-03-24 DIAGNOSIS — I49.5 SSS (SICK SINUS SYNDROME): ICD-10-CM

## 2023-03-24 RX ORDER — FLECAINIDE ACETATE 50 MG/1
50 TABLET ORAL EVERY 12 HOURS
Qty: 180 TABLET | Refills: 3 | Status: SHIPPED | OUTPATIENT
Start: 2023-03-24

## 2023-03-24 NOTE — TELEPHONE ENCOUNTER
Patient requesting refill on her flecainide to be sent to [facility] [place] for 90 days supply.  She does not want use her mail order.

## 2023-04-19 NOTE — TELEPHONE ENCOUNTER
Please let her know, that is a side effect. Keep her feet elevated and limit her sodium intake. If it worsens, we will d/c. For now, let's see if changing diet will help. If not, we will try and change her losartan.

## 2023-04-19 NOTE — TELEPHONE ENCOUNTER
Spoke with pt, she does not wish to change diet, stating that she does not eat that much salt.  She ask for the med to be changed.

## 2023-04-20 RX ORDER — TELMISARTAN 80 MG/1
80 TABLET ORAL DAILY
Qty: 90 TABLET | Refills: 3 | Status: SHIPPED | OUTPATIENT
Start: 2023-04-20

## 2023-10-17 ENCOUNTER — OFFICE VISIT (OUTPATIENT)
Dept: CARDIOLOGY | Facility: CLINIC | Age: 73
End: 2023-10-17
Payer: MEDICARE

## 2023-10-17 VITALS
DIASTOLIC BLOOD PRESSURE: 90 MMHG | BODY MASS INDEX: 36.44 KG/M2 | WEIGHT: 198 LBS | HEART RATE: 72 BPM | HEIGHT: 62 IN | SYSTOLIC BLOOD PRESSURE: 144 MMHG

## 2023-10-17 DIAGNOSIS — I49.3 PVC (PREMATURE VENTRICULAR CONTRACTION): Primary | ICD-10-CM

## 2023-10-17 DIAGNOSIS — R00.2 PALPITATIONS: ICD-10-CM

## 2023-10-17 DIAGNOSIS — I10 ESSENTIAL HYPERTENSION: ICD-10-CM

## 2023-10-17 DIAGNOSIS — I49.5 SSS (SICK SINUS SYNDROME): ICD-10-CM

## 2023-10-17 DIAGNOSIS — E78.00 HYPERCHOLESTEREMIA: ICD-10-CM

## 2023-10-17 PROCEDURE — 93000 ELECTROCARDIOGRAM COMPLETE: CPT | Performed by: NURSE PRACTITIONER

## 2023-10-17 PROCEDURE — 99214 OFFICE O/P EST MOD 30 MIN: CPT | Performed by: NURSE PRACTITIONER

## 2023-10-17 PROCEDURE — 1159F MED LIST DOCD IN RCRD: CPT | Performed by: NURSE PRACTITIONER

## 2023-10-17 PROCEDURE — 3077F SYST BP >= 140 MM HG: CPT | Performed by: NURSE PRACTITIONER

## 2023-10-17 PROCEDURE — 1160F RVW MEDS BY RX/DR IN RCRD: CPT | Performed by: NURSE PRACTITIONER

## 2023-10-17 PROCEDURE — 3080F DIAST BP >= 90 MM HG: CPT | Performed by: NURSE PRACTITIONER

## 2023-10-17 RX ORDER — AMLODIPINE BESYLATE 5 MG/1
5 TABLET ORAL DAILY
Qty: 90 TABLET | Refills: 3 | Status: SHIPPED | OUTPATIENT
Start: 2023-10-17

## 2023-10-17 RX ORDER — HYDROCHLOROTHIAZIDE 25 MG/1
25 TABLET ORAL DAILY
Qty: 90 TABLET | Refills: 3 | Status: SHIPPED | OUTPATIENT
Start: 2023-10-17

## 2023-10-17 RX ORDER — LOSARTAN POTASSIUM 100 MG/1
100 TABLET ORAL DAILY
Qty: 90 TABLET | Refills: 3 | Status: SHIPPED | OUTPATIENT
Start: 2023-10-17

## 2023-10-17 RX ORDER — ATORVASTATIN CALCIUM 10 MG/1
10 TABLET, FILM COATED ORAL DAILY
Qty: 90 TABLET | Refills: 3 | Status: SHIPPED | OUTPATIENT
Start: 2023-10-17

## 2023-10-17 RX ORDER — FLECAINIDE ACETATE 50 MG/1
50 TABLET ORAL EVERY 12 HOURS
Qty: 180 TABLET | Refills: 3 | Status: SHIPPED | OUTPATIENT
Start: 2023-10-17

## 2023-10-17 NOTE — LETTER
October 17, 2023       No Recipients    Patient: Brandie Nesbitt   YOB: 1950   Date of Visit: 10/17/2023       Dear TROY Maher    Brandie Nesbitt was in my office today. Below is a copy of my note.    If you have questions, please do not hesitate to call me. I look forward to following Brandie along with you.         Sincerely,        TROY Bonilla        CC:   No Recipients    Chief Complaint   Patient presents with   • Follow-up     Cardiac management   • Lab     Last labs about 6 weeks ago per PCP.   • Pacemaker Check     Last Medtronic PPM interrogation 3/22/23.   • Chest Pain     Continues to having aching pain left upper chest, reports same as before. No worsening.   • Shortness of Breath     Feels a little worse, she feels related to PVCs.   • Irregular Heart Beat     Feels she has been having more PVCs, feels tightness in throat. She has been out of magnesium for the last week.   • Med Refill     Needs refills on flecainide, magnesium, HCTZ and atorvastatin-90 day. Patient went over medications verbally.     Subjective      Barndie Nesbitt is a 72 y.o. female with HTN, hyperlipidemia, PVCs and SSS diagnosed in 2014 when she had a Medtronic pacemaker placed in [place]. She established care in January 2018 after moving to [place]. EKG at consult showed atrial pacing, occasional PVC. Lopressor 25 mg BID added.  Echo stable.  In January 2020, she reported increasing HOYOS. Stress test and echocardiogram showed normal LVEF, no ischemia.  Carotid US showed 50% LICA, normal right. Repeated 5/19/2021 remained stable.     She then went to the hospital after COVID infection in Feb 2023. She reported they told her at work her heart rate was low, despite having a pacemaker, noted to have PVCs. She was noted to be mildly dehydrated at [facility] ER and received fluids and felt better after. Stress and echo were advised. Stress 2/2023 negative for ischemia with good LV function noted. Echo  showed no significant valve concerns. Flecainide added for PVC management.  EKG done after addition showed atrial pacing and no PVCs. Labs done 2/3/2023 showed: Na 140, K 4.3, Creatinine 0.98, BUN 22, Mag 2.0, TSH 3.5, HH 14.1/42.9.     She returns today for follow up. Denies anginal chest pain, mild SOB she relates to PVC. Initially, she had less palpitations after starting flecainide, now feeling them more often. No dizziness.  Device check on 3/22/2023 showed 99.8% right atrial paced, 2.5 years battery life remaining, no PVC. Labs per PCP 6 weeks ago. She ran out of mag one week ago.        Cardiac History:    Past Surgical History:   Procedure Laterality Date   • CARDIOVASCULAR STRESS TEST  01/08/2020    7 Min. 7. METS. 89% THR. BP- 158/84. EF > 70%. Breast Attenuation.   • CARDIOVASCULAR STRESS TEST  02/03/2023    EF 59%, no ischemia   • ECHO - CONVERTED  01/04/2018    EF 65%. RVSP- 31 mmHg   • ECHO - CONVERTED  01/08/2020    EF 60%. LA- 3.9 Cm. Mild- Mod MR. RVSP- 34 mmHg.   • ECHO - CONVERTED  12/30/2021    EF 60%. LA- 4.2 Cm. Mild MR. RVSP- 42 mmHg   • ECHO - CONVERTED  07/12/2022    EF 65%. LA- 4.0. Trace-Mild MR. RVSP- 28 mmHg   • ECHO - CONVERTED  02/03/2023    EF 60%, LVH, PVCs, RVSP 47mmHg, diastolic dysfunction   • PACEMAKER IMPLANTATION  11/26/2014    Medtronic @ [facility]   • US CAROTID UNILATERAL  01/02/2020    50% on the left, none on right   • US CAROTID UNILATERAL  05/19/2021    <50% bilaterally     Current Outpatient Medications   Medication Sig Dispense Refill   • aspirin 81 MG EC tablet Take 1 tablet by mouth Daily.     • atorvastatin (LIPITOR) 10 MG tablet Take 1 tablet by mouth Daily. 90 tablet 3   • cyanocobalamin (VITAMIN B-12) 2000 MCG tablet Take 1 tablet by mouth Daily.     • flecainide (TAMBOCOR) 50 MG tablet Take 1 tablet by mouth Every 12 (Twelve) Hours. 180 tablet 3   • hydroCHLOROthiazide (HYDRODIURIL) 25 MG tablet Take 1 tablet by mouth Daily. 90 tablet 3   • magnesium oxide  (MAG-OX) 400 MG tablet Take 1 tablet by mouth Daily. 90 tablet 1   • meloxicam (MOBIC) 15 MG tablet Take 1 tablet by mouth Daily As Needed.     • metFORMIN ER (GLUCOPHAGE-XR) 500 MG 24 hr tablet Take 1 tablet by mouth Daily With Breakfast.     • metoprolol tartrate (LOPRESSOR) 25 MG tablet Take 1 tablet by mouth 2 (Two) Times a Day. 180 tablet 3   • amLODIPine (NORVASC) 5 MG tablet Take 1 tablet by mouth Daily. 90 tablet 3   • losartan (Cozaar) 100 MG tablet Take 1 tablet by mouth Daily. 90 tablet 3     No current facility-administered medications for this visit.     Patient has no known allergies.    Past Medical History:   Diagnosis Date   • Breast abrasion    • Diabetes mellitus    • History of kidney stones    • History of lumpectomy    • History of tonsillectomy    • Hypertension    • Pacemaker     Medtronic   • Sleep apnea    • Tennis elbow      Social History     Socioeconomic History   • Marital status: Unknown   Tobacco Use   • Smoking status: Never   • Smokeless tobacco: Never   Vaping Use   • Vaping Use: Never used   Substance and Sexual Activity   • Alcohol use: Yes     Comment: rarely   • Drug use: No   • Sexual activity: Defer     Family History   Problem Relation Age of Onset   • Heart attack Mother    • Hypertension Mother    • Hyperlipidemia Mother    • Cancer Mother    • Stroke Father    • Hyperlipidemia Father    • Hypertension Father    • Cancer Father    • Heart disease Sister    • Diabetes Sister    • Stroke Sister    • Heart disease Brother    • Diabetes Brother    • Stroke Brother    • Stroke Maternal Grandmother    • Heart attack Maternal Grandfather    • Heart attack Paternal Grandmother    • Stroke Paternal Grandmother    • Heart attack Paternal Grandfather    • Stroke Paternal Grandfather      Review of Systems   Constitutional: Positive for weight gain (7). Negative for decreased appetite and malaise/fatigue.   HENT: Negative.     Eyes:  Negative for blurred vision.   Cardiovascular:   "Positive for palpitations. Negative for chest pain, dyspnea on exertion, leg swelling and syncope.   Respiratory:  Positive for shortness of breath. Negative for sleep disturbances due to breathing.    Endocrine: Negative.    Hematologic/Lymphatic: Negative for bleeding problem. Does not bruise/bleed easily.   Skin: Negative.    Musculoskeletal:  Negative for falls and myalgias.   Gastrointestinal:  Negative for abdominal pain, heartburn and melena.   Genitourinary:  Negative for hematuria.   Neurological:  Negative for dizziness and light-headedness.   Psychiatric/Behavioral:  Negative for altered mental status.    Allergic/Immunologic: Negative.         Objective    /90 (BP Location: Right arm)   Pulse 72   Ht 157.5 cm (62.01\")   Wt 89.8 kg (198 lb)   BMI 36.21 kg/m²     Vitals and nursing note reviewed.   Constitutional:       General: Not in acute distress.     Appearance: Well-developed. Not diaphoretic.   Eyes:      Pupils: Pupils are equal, round, and reactive to light.   HENT:      Head: Normocephalic.   Pulmonary:      Effort: Pulmonary effort is normal. No respiratory distress.      Breath sounds: Normal breath sounds.   Cardiovascular:      Normal rate. Regular rhythm.   Pulses:     Intact distal pulses.   Edema:     Peripheral edema absent.   Abdominal:      General: Bowel sounds are normal.      Palpations: Abdomen is soft.   Musculoskeletal: Normal range of motion.      Cervical back: Normal range of motion. Skin:     General: Skin is warm and dry.   Neurological:      Mental Status: Alert and oriented to person, place, and time.          Ekg    Date/Time: 10/17/2023 10:44 AM  Performed by: Alisa Morse APRN    Authorized by: Alisa Morse APRN  Comparison: compared with previous ECG from 3/22/2023  Similar to previous ECG  Rhythm: paced  Ectopy: unifocal PVCs  Rate: normal  BPM: 72  ST Segments: ST segments normal    Clinical impression: abnormal EKG  Comments: Atrial paced  Ventricular " sensed  One PVC               Problem List Items Addressed This Visit          Cardiac and Vasculature    SSS (sick sinus syndrome)    Relevant Medications    amLODIPine (NORVASC) 5 MG tablet    flecainide (TAMBOCOR) 50 MG tablet    metoprolol tartrate (LOPRESSOR) 25 MG tablet    Essential hypertension    Relevant Medications    losartan (Cozaar) 100 MG tablet    amLODIPine (NORVASC) 5 MG tablet    metoprolol tartrate (LOPRESSOR) 25 MG tablet    hydroCHLOROthiazide (HYDRODIURIL) 25 MG tablet    Hypercholesteremia    Relevant Medications    atorvastatin (LIPITOR) 10 MG tablet    Palpitations    Relevant Medications    flecainide (TAMBOCOR) 50 MG tablet      HTN- elevated today at 144/90. Increase amlodipine to 5 mg daily. Goal bp <130/80 in a diabetic patient. Continue metoprolol, losartan, HCTZ. Limit Na. Continue weight loss efforts.      YVES- uncorrected as she does not tolerate CPAP. Side lying sleep, weight loss.      SSS- EKG today showed atrial pacing with single PVC. She is on flecainide 50 mg BID. QT slightly prolonged but acceptable when corrected for QRS. Resume mag supp. Check Medtronic device. If PVC burden is high, consider changing anti-arrhythmic or refer to EP for opinion.      PVC- EKG showed atrial pacing, continue flecainide, metoprolol, magnesium. She did not tolerate higher dose metoprolol due to hair loss.      Hyperlipidemia- Continue Lipitor, LDL at goal.      Diabetes- A1C remains slightly above goal. Continue low sugar, low carbohydrate diet.       FU 6 months or sooner if needed.               Electronically signed by TROY Bonilla,  October 17, 2023 10:24 EDT

## 2023-10-17 NOTE — PROGRESS NOTES
Chief Complaint   Patient presents with    Follow-up     Cardiac management    Lab     Last labs about 6 weeks ago per PCP.    Pacemaker Check     Last Medtronic PPM interrogation 3/22/23.    Chest Pain     Continues to having aching pain left upper chest, reports same as before. No worsening.    Shortness of Breath     Feels a little worse, she feels related to PVCs.    Irregular Heart Beat     Feels she has been having more PVCs, feels tightness in throat. She has been out of magnesium for the last week.    Med Refill     Needs refills on flecainide, magnesium, HCTZ and atorvastatin-90 day. Patient went over medications verbally.     Subjective       Brandie Nesbitt is a 72 y.o. female with HTN, hyperlipidemia, PVCs and SSS diagnosed in 2014 when she had a Medtronic pacemaker placed in [place]. She established care in January 2018 after moving to [place]. EKG at consult showed atrial pacing, occasional PVC. Lopressor 25 mg BID added.  Echo stable.  In January 2020, she reported increasing HOYOS. Stress test and echocardiogram showed normal LVEF, no ischemia.  Carotid US showed 50% LICA, normal right. Repeated 5/19/2021 remained stable.     She then went to the hospital after COVID infection in Feb 2023. She reported they told her at work her heart rate was low, despite having a pacemaker, noted to have PVCs. She was noted to be mildly dehydrated at [facility] ER and received fluids and felt better after. Stress and echo were advised. Stress 2/2023 negative for ischemia with good LV function noted. Echo showed no significant valve concerns. Flecainide added for PVC management.  EKG done after addition showed atrial pacing and no PVCs. Labs done 2/3/2023 showed: Na 140, K 4.3, Creatinine 0.98, BUN 22, Mag 2.0, TSH 3.5, HH 14.1/42.9.     She returns today for follow up. Denies anginal chest pain, mild SOB she relates to PVC. Initially, she had less palpitations after starting flecainide, now feeling them more often. No  dizziness.  Device check on 3/22/2023 showed 99.8% right atrial paced, 2.5 years battery life remaining, no PVC. Labs per PCP 6 weeks ago. She ran out of mag one week ago.        Cardiac History:    Past Surgical History:   Procedure Laterality Date    CARDIOVASCULAR STRESS TEST  01/08/2020    7 Min. 7. METS. 89% THR. BP- 158/84. EF > 70%. Breast Attenuation.    CARDIOVASCULAR STRESS TEST  02/03/2023    EF 59%, no ischemia    ECHO - CONVERTED  01/04/2018    EF 65%. RVSP- 31 mmHg    ECHO - CONVERTED  01/08/2020    EF 60%. LA- 3.9 Cm. Mild- Mod MR. RVSP- 34 mmHg.    ECHO - CONVERTED  12/30/2021    EF 60%. LA- 4.2 Cm. Mild MR. RVSP- 42 mmHg    ECHO - CONVERTED  07/12/2022    EF 65%. LA- 4.0. Trace-Mild MR. RVSP- 28 mmHg    ECHO - CONVERTED  02/03/2023    EF 60%, LVH, PVCs, RVSP 47mmHg, diastolic dysfunction    PACEMAKER IMPLANTATION  11/26/2014    Medtronic @ [facility] CAROTID UNILATERAL  01/02/2020    50% on the left, none on right     CAROTID UNILATERAL  05/19/2021    <50% bilaterally     Current Outpatient Medications   Medication Sig Dispense Refill    aspirin 81 MG EC tablet Take 1 tablet by mouth Daily.      atorvastatin (LIPITOR) 10 MG tablet Take 1 tablet by mouth Daily. 90 tablet 3    cyanocobalamin (VITAMIN B-12) 2000 MCG tablet Take 1 tablet by mouth Daily.      flecainide (TAMBOCOR) 50 MG tablet Take 1 tablet by mouth Every 12 (Twelve) Hours. 180 tablet 3    hydroCHLOROthiazide (HYDRODIURIL) 25 MG tablet Take 1 tablet by mouth Daily. 90 tablet 3    magnesium oxide (MAG-OX) 400 MG tablet Take 1 tablet by mouth Daily. 90 tablet 1    meloxicam (MOBIC) 15 MG tablet Take 1 tablet by mouth Daily As Needed.      metFORMIN ER (GLUCOPHAGE-XR) 500 MG 24 hr tablet Take 1 tablet by mouth Daily With Breakfast.      metoprolol tartrate (LOPRESSOR) 25 MG tablet Take 1 tablet by mouth 2 (Two) Times a Day. 180 tablet 3    amLODIPine (NORVASC) 5 MG tablet Take 1 tablet by mouth Daily. 90 tablet 3    losartan  (Cozaar) 100 MG tablet Take 1 tablet by mouth Daily. 90 tablet 3     No current facility-administered medications for this visit.     Patient has no known allergies.    Past Medical History:   Diagnosis Date    Breast abrasion     Diabetes mellitus     History of kidney stones     History of lumpectomy     History of tonsillectomy     Hypertension     Pacemaker     Medtronic    Sleep apnea     Tennis elbow      Social History     Socioeconomic History    Marital status: Unknown   Tobacco Use    Smoking status: Never    Smokeless tobacco: Never   Vaping Use    Vaping Use: Never used   Substance and Sexual Activity    Alcohol use: Yes     Comment: rarely    Drug use: No    Sexual activity: Defer     Family History   Problem Relation Age of Onset    Heart attack Mother     Hypertension Mother     Hyperlipidemia Mother     Cancer Mother     Stroke Father     Hyperlipidemia Father     Hypertension Father     Cancer Father     Heart disease Sister     Diabetes Sister     Stroke Sister     Heart disease Brother     Diabetes Brother     Stroke Brother     Stroke Maternal Grandmother     Heart attack Maternal Grandfather     Heart attack Paternal Grandmother     Stroke Paternal Grandmother     Heart attack Paternal Grandfather     Stroke Paternal Grandfather      Review of Systems   Constitutional: Positive for weight gain (7). Negative for decreased appetite and malaise/fatigue.   HENT: Negative.     Eyes:  Negative for blurred vision.   Cardiovascular:  Positive for palpitations. Negative for chest pain, dyspnea on exertion, leg swelling and syncope.   Respiratory:  Positive for shortness of breath. Negative for sleep disturbances due to breathing.    Endocrine: Negative.    Hematologic/Lymphatic: Negative for bleeding problem. Does not bruise/bleed easily.   Skin: Negative.    Musculoskeletal:  Negative for falls and myalgias.   Gastrointestinal:  Negative for abdominal pain, heartburn and melena.   Genitourinary:   "Negative for hematuria.   Neurological:  Negative for dizziness and light-headedness.   Psychiatric/Behavioral:  Negative for altered mental status.    Allergic/Immunologic: Negative.         Objective     /90 (BP Location: Right arm)   Pulse 72   Ht 157.5 cm (62.01\")   Wt 89.8 kg (198 lb)   BMI 36.21 kg/m²     Vitals and nursing note reviewed.   Constitutional:       General: Not in acute distress.     Appearance: Well-developed. Not diaphoretic.   Eyes:      Pupils: Pupils are equal, round, and reactive to light.   HENT:      Head: Normocephalic.   Pulmonary:      Effort: Pulmonary effort is normal. No respiratory distress.      Breath sounds: Normal breath sounds.   Cardiovascular:      Normal rate. Regular rhythm.   Pulses:     Intact distal pulses.   Edema:     Peripheral edema absent.   Abdominal:      General: Bowel sounds are normal.      Palpations: Abdomen is soft.   Musculoskeletal: Normal range of motion.      Cervical back: Normal range of motion. Skin:     General: Skin is warm and dry.   Neurological:      Mental Status: Alert and oriented to person, place, and time.          Ekg    Date/Time: 10/17/2023 10:44 AM  Performed by: Alisa Morse APRN    Authorized by: Alisa Morse APRN  Comparison: compared with previous ECG from 3/22/2023  Similar to previous ECG  Rhythm: paced  Ectopy: unifocal PVCs  Rate: normal  BPM: 72  ST Segments: ST segments normal    Clinical impression: abnormal EKG  Comments: Atrial paced  Ventricular sensed  One PVC               Problem List Items Addressed This Visit          Cardiac and Vasculature    SSS (sick sinus syndrome)    Relevant Medications    amLODIPine (NORVASC) 5 MG tablet    flecainide (TAMBOCOR) 50 MG tablet    metoprolol tartrate (LOPRESSOR) 25 MG tablet    Essential hypertension    Relevant Medications    losartan (Cozaar) 100 MG tablet    amLODIPine (NORVASC) 5 MG tablet    metoprolol tartrate (LOPRESSOR) 25 MG tablet    hydroCHLOROthiazide " (HYDRODIURIL) 25 MG tablet    Hypercholesteremia    Relevant Medications    atorvastatin (LIPITOR) 10 MG tablet    Palpitations    Relevant Medications    flecainide (TAMBOCOR) 50 MG tablet      HTN- elevated today at 144/90. Increase amlodipine to 5 mg daily. Goal bp <130/80 in a diabetic patient. Continue metoprolol, losartan, HCTZ. Limit Na. Continue weight loss efforts.      YVES- uncorrected as she does not tolerate CPAP. Side lying sleep, weight loss.      SSS- EKG today showed atrial pacing with single PVC. She is on flecainide 50 mg BID. QT slightly prolonged but acceptable when corrected for QRS. Resume mag supp. Check Medtronic device. If PVC burden is high, consider changing anti-arrhythmic or refer to EP for opinion.      PVC- EKG showed atrial pacing, continue flecainide, metoprolol, magnesium. She did not tolerate higher dose metoprolol due to hair loss.      Hyperlipidemia- Continue Lipitor, LDL at goal.      Diabetes- A1C remains slightly above goal. Continue low sugar, low carbohydrate diet.       FU 6 months or sooner if needed.               Electronically signed by TROY Bonilla,  October 17, 2023 10:24 EDT

## 2023-10-25 ENCOUNTER — OFFICE VISIT (OUTPATIENT)
Dept: CARDIOLOGY | Facility: CLINIC | Age: 73
End: 2023-10-25
Payer: MEDICARE

## 2023-10-25 DIAGNOSIS — I49.5 SSS (SICK SINUS SYNDROME): Primary | ICD-10-CM

## 2023-10-25 DIAGNOSIS — R00.2 PALPITATIONS: ICD-10-CM

## 2024-03-26 DIAGNOSIS — R00.2 PALPITATIONS: ICD-10-CM

## 2024-03-26 DIAGNOSIS — I49.5 SSS (SICK SINUS SYNDROME): ICD-10-CM

## 2024-03-27 RX ORDER — FLECAINIDE ACETATE 50 MG/1
50 TABLET ORAL EVERY 12 HOURS
Qty: 180 TABLET | Refills: 0 | Status: SHIPPED | OUTPATIENT
Start: 2024-03-27

## 2024-04-30 ENCOUNTER — OFFICE VISIT (OUTPATIENT)
Dept: CARDIOLOGY | Facility: CLINIC | Age: 74
End: 2024-04-30
Payer: MEDICARE

## 2024-04-30 VITALS
BODY MASS INDEX: 35.77 KG/M2 | WEIGHT: 194.4 LBS | HEIGHT: 62 IN | DIASTOLIC BLOOD PRESSURE: 82 MMHG | HEART RATE: 73 BPM | SYSTOLIC BLOOD PRESSURE: 118 MMHG

## 2024-04-30 DIAGNOSIS — I49.3 PVC (PREMATURE VENTRICULAR CONTRACTION): ICD-10-CM

## 2024-04-30 DIAGNOSIS — I34.0 NONRHEUMATIC MITRAL VALVE REGURGITATION: ICD-10-CM

## 2024-04-30 DIAGNOSIS — E11.69 TYPE 2 DIABETES MELLITUS WITH OTHER SPECIFIED COMPLICATION, WITHOUT LONG-TERM CURRENT USE OF INSULIN: ICD-10-CM

## 2024-04-30 DIAGNOSIS — I49.5 SSS (SICK SINUS SYNDROME): Primary | ICD-10-CM

## 2024-04-30 DIAGNOSIS — R00.2 PALPITATIONS: ICD-10-CM

## 2024-04-30 DIAGNOSIS — E78.00 HYPERCHOLESTEREMIA: ICD-10-CM

## 2024-04-30 DIAGNOSIS — I10 ESSENTIAL HYPERTENSION: ICD-10-CM

## 2024-04-30 PROCEDURE — 93000 ELECTROCARDIOGRAM COMPLETE: CPT | Performed by: NURSE PRACTITIONER

## 2024-04-30 PROCEDURE — 99214 OFFICE O/P EST MOD 30 MIN: CPT | Performed by: NURSE PRACTITIONER

## 2024-04-30 PROCEDURE — 1159F MED LIST DOCD IN RCRD: CPT | Performed by: NURSE PRACTITIONER

## 2024-04-30 PROCEDURE — 1160F RVW MEDS BY RX/DR IN RCRD: CPT | Performed by: NURSE PRACTITIONER

## 2024-04-30 PROCEDURE — 3079F DIAST BP 80-89 MM HG: CPT | Performed by: NURSE PRACTITIONER

## 2024-04-30 PROCEDURE — 3074F SYST BP LT 130 MM HG: CPT | Performed by: NURSE PRACTITIONER

## 2024-04-30 RX ORDER — OXYBUTYNIN CHLORIDE 5 MG/1
5 TABLET, EXTENDED RELEASE ORAL DAILY
COMMUNITY

## 2024-04-30 RX ORDER — HYDROCHLOROTHIAZIDE 25 MG/1
25 TABLET ORAL DAILY
Qty: 90 TABLET | Refills: 3 | Status: SHIPPED | OUTPATIENT
Start: 2024-04-30

## 2024-04-30 RX ORDER — LOSARTAN POTASSIUM 100 MG/1
100 TABLET ORAL DAILY
Qty: 90 TABLET | Refills: 3 | Status: SHIPPED | OUTPATIENT
Start: 2024-04-30

## 2024-04-30 RX ORDER — MAGNESIUM OXIDE 400 MG/1
400 TABLET ORAL DAILY
Qty: 90 TABLET | Refills: 1 | Status: SHIPPED | OUTPATIENT
Start: 2024-04-30

## 2024-04-30 RX ORDER — ATORVASTATIN CALCIUM 10 MG/1
10 TABLET, FILM COATED ORAL DAILY
Qty: 90 TABLET | Refills: 3 | Status: SHIPPED | OUTPATIENT
Start: 2024-04-30

## 2024-04-30 RX ORDER — GLIPIZIDE 5 MG/1
5 TABLET, FILM COATED, EXTENDED RELEASE ORAL DAILY
COMMUNITY

## 2024-04-30 RX ORDER — AMLODIPINE BESYLATE 5 MG/1
5 TABLET ORAL DAILY
Qty: 90 TABLET | Refills: 3 | Status: SHIPPED | OUTPATIENT
Start: 2024-04-30

## 2024-04-30 RX ORDER — FLECAINIDE ACETATE 50 MG/1
50 TABLET ORAL EVERY 12 HOURS
Qty: 180 TABLET | Refills: 2 | Status: SHIPPED | OUTPATIENT
Start: 2024-04-30

## 2024-04-30 NOTE — PROGRESS NOTES
Chief Complaint   Patient presents with    Follow-up     Cardiac management    Pacemaker Check     Last Medtronic PPM interrogation 10/25/23.    Shortness of Breath     Same as before, no worsening.    Med Refill     Needs refills on cardiac medications-90 day    Lab     Last labs about a month ago per PCP. She reports having CT with abnormal findings in kidneys. She has contacted PCP for referral.     Subjective       Brandie Nesbitt is a 73 y.o. female with HTN, hyperlipidemia, PVCs and SSS diagnosed in 2014 when she had a Medtronic pacemaker placed in [place]. She established care in January 2018 after moving to [place]. EKG at consult showed atrial pacing, occasional PVC. Lopressor 25 mg BID added.  Echo stable.  In January 2020, she reported increasing HOYOS. Stress test and echocardiogram showed normal LVEF, no ischemia.  Carotid US showed 50% LICA, normal right. Repeated 5/19/2021 remained stable.     She then went to the hospital after COVID infection in Feb 2023. She reported they told her at work her heart rate was low, despite having a pacemaker, noted to have PVCs. She was noted to be mildly dehydrated at [facility] ER and received fluids and felt better after. Stress and echo were advised. Stress 2/2023 negative for ischemia with good LV function noted. Echo showed no significant valve concerns. Flecainide added for PVC management.  EKG done after addition showed atrial pacing and no PVCs. Labs done 2/3/2023 showed: Na 140, K 4.3, Creatinine 0.98, BUN 22, Mag 2.0, TSH 3.5, HH 14.1/42.9.      She returns today for follow up. Denies anginal chest pain, mild SOB she relates to PVC. Initially, she had less palpitations after starting flecainide, now feeling them more often. No dizziness.  Device check on 3/22/2023 showed 99.8% right atrial paced, 2.5 years battery life remaining, no PVC. Labs per PCP 6 weeks ago. She ran out of mag one week ago.          Cardiac History:    Past Surgical History:   Procedure  Laterality Date    CARDIOVASCULAR STRESS TEST  01/08/2020    7 Min. 7. METS. 89% THR. BP- 158/84. EF > 70%. Breast Attenuation.    CARDIOVASCULAR STRESS TEST  02/03/2023    EF 59%, no ischemia    ECHO - CONVERTED  01/04/2018    EF 65%. RVSP- 31 mmHg    ECHO - CONVERTED  01/08/2020    EF 60%. LA- 3.9 Cm. Mild- Mod MR. RVSP- 34 mmHg.    ECHO - CONVERTED  12/30/2021    EF 60%. LA- 4.2 Cm. Mild MR. RVSP- 42 mmHg    ECHO - CONVERTED  07/12/2022    EF 65%. LA- 4.0. Trace-Mild MR. RVSP- 28 mmHg    ECHO - CONVERTED  02/03/2023    EF 60%, LVH, PVCs, RVSP 47mmHg, diastolic dysfunction    PACEMAKER IMPLANTATION  11/26/2014    Medtronic @ [facility] CAROTID UNILATERAL  01/02/2020    50% on the left, none on right    US CAROTID UNILATERAL  05/19/2021    <50% bilaterally     Current Outpatient Medications   Medication Sig Dispense Refill    amLODIPine (NORVASC) 5 MG tablet Take 1 tablet by mouth Daily. 90 tablet 3    aspirin 81 MG EC tablet Take 1 tablet by mouth Daily.      atorvastatin (LIPITOR) 10 MG tablet Take 1 tablet by mouth Daily. 90 tablet 3    cyanocobalamin (VITAMIN B-12) 2000 MCG tablet Take 1 tablet by mouth Daily.      flecainide (TAMBOCOR) 50 MG tablet Take 1 tablet by mouth Every 12 (Twelve) Hours. 180 tablet 2    glipizide (GLUCOTROL XL) 5 MG ER tablet Take 1 tablet by mouth Daily.      hydroCHLOROthiazide 25 MG tablet Take 1 tablet by mouth Daily. 90 tablet 3    losartan (Cozaar) 100 MG tablet Take 1 tablet by mouth Daily. 90 tablet 3    magnesium oxide (MAG-OX) 400 MG tablet Take 1 tablet by mouth Daily. 90 tablet 1    meloxicam (MOBIC) 15 MG tablet Take 1 tablet by mouth Daily As Needed.      metFORMIN ER (GLUCOPHAGE-XR) 500 MG 24 hr tablet Take 1 tablet by mouth Daily With Breakfast.      metoprolol tartrate (LOPRESSOR) 25 MG tablet Take 1 tablet by mouth 2 (Two) Times a Day. 180 tablet 3    oxybutynin XL (DITROPAN-XL) 5 MG 24 hr tablet Take 1 tablet by mouth Daily.       No current  facility-administered medications for this visit.     Patient has no known allergies.    Past Medical History:   Diagnosis Date    Breast abrasion     Diabetes mellitus     History of kidney stones     History of lumpectomy     History of tonsillectomy     Hypertension     Pacemaker     Medtronic    Sleep apnea     Tennis elbow      Social History     Socioeconomic History    Marital status: Unknown   Tobacco Use    Smoking status: Never     Passive exposure: Past    Smokeless tobacco: Never   Vaping Use    Vaping status: Never Used   Substance and Sexual Activity    Alcohol use: Yes     Comment: rarely    Drug use: No    Sexual activity: Defer     Family History   Problem Relation Age of Onset    Heart attack Mother     Hypertension Mother     Hyperlipidemia Mother     Cancer Mother     Stroke Father     Hyperlipidemia Father     Hypertension Father     Cancer Father     Heart disease Sister     Diabetes Sister     Stroke Sister     Heart disease Brother     Diabetes Brother     Stroke Brother     Stroke Maternal Grandmother     Heart attack Maternal Grandfather     Heart attack Paternal Grandmother     Stroke Paternal Grandmother     Heart attack Paternal Grandfather     Stroke Paternal Grandfather      Review of Systems   Constitutional: Positive for weight loss (-4). Negative for decreased appetite and malaise/fatigue.   HENT: Negative.     Eyes:  Negative for blurred vision.   Cardiovascular:  Positive for leg swelling. Negative for chest pain, dyspnea on exertion, palpitations and syncope.   Respiratory:  Positive for shortness of breath. Negative for sleep disturbances due to breathing.    Endocrine: Negative.    Hematologic/Lymphatic: Negative for bleeding problem. Does not bruise/bleed easily.   Skin: Negative.    Musculoskeletal:  Negative for falls and myalgias.   Gastrointestinal:  Negative for abdominal pain, heartburn and melena.   Genitourinary:  Negative for hematuria.   Neurological:  Negative for  "dizziness and light-headedness.   Psychiatric/Behavioral:  Negative for altered mental status.    Allergic/Immunologic: Negative.       Objective     /82 (BP Location: Left arm, Patient Position: Sitting)   Pulse 73   Ht 157.5 cm (62.01\")   Wt 88.2 kg (194 lb 6.4 oz)   BMI 35.55 kg/m²     Vitals and nursing note reviewed.   Constitutional:       General: Not in acute distress.     Appearance: Well-developed. Not diaphoretic.   Eyes:      Pupils: Pupils are equal, round, and reactive to light.   HENT:      Head: Normocephalic.   Pulmonary:      Effort: Pulmonary effort is normal. No respiratory distress.      Breath sounds: Normal breath sounds.   Cardiovascular:      Normal rate. Regular rhythm.      Murmurs: There is a grade 1/6 systolic murmur.   Pulses:     Intact distal pulses.   Edema:     Peripheral edema present.     Pretibial: bilateral 1+ edema of the pretibial area.  Abdominal:      General: Bowel sounds are normal.      Palpations: Abdomen is soft.   Musculoskeletal: Normal range of motion.      Cervical back: Normal range of motion. Skin:     General: Skin is warm and dry.   Neurological:      Mental Status: Alert and oriented to person, place, and time.         ECG 12 Lead    Date/Time: 4/30/2024 8:20 AM  Performed by: Alisa Morse APRN    Authorized by: Alisa Morse APRN  Comparison: compared with previous ECG from 10/17/2023  Similar to previous ECG  Rhythm: paced  Rate: normal  BPM: 73  ST Segments: ST segments normal    Clinical impression: non-specific ECG  Comments: Atrial paced              Problem List Items Addressed This Visit          Cardiac and Vasculature    SSS (sick sinus syndrome) - Primary    Relevant Medications    flecainide (TAMBOCOR) 50 MG tablet    metoprolol tartrate (LOPRESSOR) 25 MG tablet    amLODIPine (NORVASC) 5 MG tablet    Other Relevant Orders    ECG 12 Lead    Essential hypertension    Relevant Medications    losartan (Cozaar) 100 MG tablet    metoprolol " tartrate (LOPRESSOR) 25 MG tablet    amLODIPine (NORVASC) 5 MG tablet    hydroCHLOROthiazide 25 MG tablet    Hypercholesteremia    Relevant Medications    atorvastatin (LIPITOR) 10 MG tablet    Palpitations    Relevant Medications    flecainide (TAMBOCOR) 50 MG tablet    Other Relevant Orders    ECG 12 Lead    PVC (premature ventricular contraction)    Relevant Medications    metoprolol tartrate (LOPRESSOR) 25 MG tablet    amLODIPine (NORVASC) 5 MG tablet    Other Relevant Orders    ECG 12 Lead    Nonrheumatic mitral valve regurgitation    Relevant Medications    metoprolol tartrate (LOPRESSOR) 25 MG tablet    amLODIPine (NORVASC) 5 MG tablet       Endocrine and Metabolic    Type 2 diabetes mellitus, without long-term current use of insulin    Relevant Medications    glipizide (GLUCOTROL XL) 5 MG ER tablet      HTN  -Well-controlled after increasing amlodipine to 5 mg daily.  Leg edema slightly worsened, compression and elevation.  -Continue metoprolol, losartan, HCTZ. Limit Na. Continue weight loss efforts.      YVES  -uncorrected as she does not tolerate CPAP  -Side lying sleep, weight loss.      SSS  -EKG is atrial paced   -Normal QT   -Check device at next [employer] clinic, 1.5 years battery life remaining on last interrogation    PVC  -EKG showed atrial pacing, no PVC   -continue flecainide, metoprolol, magnesium       Hyperlipidemia  -Continue Lipitor, LDL at goal.      Diabetes  -A1C remains above goal, 8.7%  -Advised to follow-up with PCP regarding labs  -Continue low sugar, low carbohydrate diet.       FU 6 months or sooner if needed.     Class 2 Severe Obesity (BMI >=35 and <=39.9). Obesity-related health conditions include the following: obstructive sleep apnea, hypertension, coronary heart disease, diabetes mellitus, dyslipidemias, and GERD. Obesity is unchanged. BMI is is above average; BMI management plan is completed. We discussed portion control and increasing exercise.               Electronically  signed by TROY Bonilla,  April 30, 2024 11:32 EDT

## 2024-05-21 ENCOUNTER — TELEPHONE (OUTPATIENT)
Dept: CARDIOLOGY | Facility: CLINIC | Age: 74
End: 2024-05-21
Payer: MEDICARE

## 2024-05-21 NOTE — TELEPHONE ENCOUNTER
They are needing to reschedule  her PPM check to 5/24/24.  Her last check was in Oct 2023, there was 1.5 years left on battery,  ok to wait until then for a check?

## 2024-05-24 ENCOUNTER — OFFICE VISIT (OUTPATIENT)
Dept: CARDIOLOGY | Facility: CLINIC | Age: 74
End: 2024-05-24
Payer: MEDICARE

## 2024-05-24 DIAGNOSIS — I49.5 SSS (SICK SINUS SYNDROME): Primary | ICD-10-CM

## 2024-05-24 DIAGNOSIS — I49.3 PVC (PREMATURE VENTRICULAR CONTRACTION): ICD-10-CM

## 2024-06-14 ENCOUNTER — TELEPHONE (OUTPATIENT)
Dept: CARDIOLOGY | Facility: CLINIC | Age: 74
End: 2024-06-14
Payer: MEDICARE

## 2024-06-14 NOTE — TELEPHONE ENCOUNTER
REQUEST FOR CARDIAC CLEARANCE    Caller name: Brandie Nesbitt     Phone Number: [phone]    Surgeon's name: DR ALVES    Type of planned surgery: DNC    Date of planned surgery: 6.21.24    Type of anesthesia: UNKNOWN    Have you been experiencing chest pain or shortness of breath? SOB    Is your doctor requesting for you to stop any of your medications prior to your surgery? NO    NEEDS CLEARANCE THIS IS OKAY WITH PACEMAKER   PATIENT REQUESTS YOU CALL [facility] [place] [facility] @ [phone]

## 2024-06-17 ENCOUNTER — TELEPHONE (OUTPATIENT)
Dept: CARDIOLOGY | Facility: CLINIC | Age: 74
End: 2024-06-17
Payer: MEDICARE

## 2024-06-17 NOTE — TELEPHONE ENCOUNTER
Received fax from Dr. Javon Schultz for cardiac clearance for patient to have hysteroscopy/fractionated D&C. Patient is on aspirin, unclear if needing to hold.  According to our records, I do not see where patient has had any stenting. Patient has a pacemaker.          Fax [phone]

## 2024-06-24 DIAGNOSIS — I49.5 SSS (SICK SINUS SYNDROME): ICD-10-CM

## 2024-06-24 DIAGNOSIS — R00.2 PALPITATIONS: ICD-10-CM

## 2024-06-24 RX ORDER — FLECAINIDE ACETATE 50 MG/1
50 TABLET ORAL EVERY 12 HOURS
Qty: 180 TABLET | Refills: 2 | Status: SHIPPED | OUTPATIENT
Start: 2024-06-24

## 2024-06-24 NOTE — TELEPHONE ENCOUNTER
Caller: NesbittBrandie    Relationship: Self    Best call back number: [phone]    Requested Prescriptions:   Requested Prescriptions     Pending Prescriptions Disp Refills    flecainide (TAMBOCOR) 50 MG tablet 180 tablet 2     Sig: Take 1 tablet by mouth Every 12 (Twelve) Hours.        Pharmacy where request should be sent: [facility] [address] [place] [address] [phone] [facility] [phone] FX     Last office visit with prescribing clinician: 4/30/2024   Last telemedicine visit with prescribing clinician: Visit date not found   Next office visit with prescribing clinician: 11/12/2024     Additional details provided by patient:     Does the patient have less than a 3 day supply:  [] Yes  [x] No    Would you like a call back once the refill request has been completed: [] Yes [x] No    If the office needs to give you a call back, can they leave a voicemail: [] Yes [x] No    Jaylin Clark   06/24/24 08:48 EDT

## 2024-07-31 ENCOUNTER — TELEPHONE (OUTPATIENT)
Dept: CARDIOLOGY | Facility: CLINIC | Age: 74
End: 2024-07-31
Payer: MEDICARE

## 2024-07-31 NOTE — TELEPHONE ENCOUNTER
Patient called in with active symptoms, patient denied going to ER per hub, patient relayed that their PVC's have been worsening today. They state it feels like a knot in their throat and chest. Patient relayed that their having worsening shortness of breath as well today. Patient was unable to check blood pressure but heart rate while on call was 71.

## 2024-07-31 NOTE — TELEPHONE ENCOUNTER
She can try taking extra 50 mg of flecainide PRN    Can she come in for an EKG tomorrow or maybe check her remote pacer monitor?

## 2024-08-01 NOTE — TELEPHONE ENCOUNTER
Patient relayed they are feeling better today and relayed that they took an extra mag-ox yesterday. Patient does not have a car at the moment of call and would like to try just taking flecainide as needed for now. Patient will call back if they have the sensation again.

## 2024-10-08 DIAGNOSIS — I10 ESSENTIAL HYPERTENSION: ICD-10-CM

## 2024-10-08 DIAGNOSIS — E78.00 HYPERCHOLESTEREMIA: ICD-10-CM

## 2024-10-08 RX ORDER — HYDROCHLOROTHIAZIDE 25 MG/1
25 TABLET ORAL DAILY
Qty: 90 TABLET | Refills: 3 | Status: SHIPPED | OUTPATIENT
Start: 2024-10-08

## 2024-10-08 RX ORDER — LOSARTAN POTASSIUM 100 MG/1
100 TABLET ORAL DAILY
Qty: 90 TABLET | Refills: 3 | Status: SHIPPED | OUTPATIENT
Start: 2024-10-08

## 2024-10-08 RX ORDER — ATORVASTATIN CALCIUM 10 MG/1
10 TABLET, FILM COATED ORAL DAILY
Qty: 90 TABLET | Refills: 3 | Status: SHIPPED | OUTPATIENT
Start: 2024-10-08

## 2024-10-08 NOTE — TELEPHONE ENCOUNTER
Rx Refill Note  Requested Prescriptions     Pending Prescriptions Disp Refills    hydroCHLOROthiazide 25 MG tablet [Pharmacy Med Name: HYDROCHLOROTHIAZIDE TABS 25MG] 90 tablet 3     Sig: TAKE 1 TABLET DAILY    atorvastatin (LIPITOR) 10 MG tablet [Pharmacy Med Name: ATORVASTATIN TABS 10MG] 90 tablet 3     Sig: TAKE 1 TABLET DAILY    losartan (COZAAR) 100 MG tablet [Pharmacy Med Name: LOSARTAN TABS 100MG] 90 tablet 3     Sig: TAKE 1 TABLET DAILY      Last office visit with prescribing clinician: 4/30/2024   Last telemedicine visit with prescribing clinician: Visit date not found   Next office visit with prescribing clinician: 11/12/2024                         Would you like a call back once the refill request has been completed: [] Yes [] No    If the office needs to give you a call back, can they leave a voicemail: [] Yes [] No    Ninfa Arizmendi CMA  10/08/24, 09:09 EDT

## 2024-11-12 ENCOUNTER — OFFICE VISIT (OUTPATIENT)
Dept: CARDIOLOGY | Facility: CLINIC | Age: 74
End: 2024-11-12
Payer: MEDICARE

## 2024-11-12 VITALS
HEART RATE: 75 BPM | BODY MASS INDEX: 33.79 KG/M2 | HEIGHT: 62 IN | SYSTOLIC BLOOD PRESSURE: 130 MMHG | DIASTOLIC BLOOD PRESSURE: 82 MMHG | WEIGHT: 183.6 LBS

## 2024-11-12 DIAGNOSIS — R00.2 PALPITATIONS: ICD-10-CM

## 2024-11-12 DIAGNOSIS — I49.3 PVC (PREMATURE VENTRICULAR CONTRACTION): ICD-10-CM

## 2024-11-12 DIAGNOSIS — E78.00 HYPERCHOLESTEREMIA: ICD-10-CM

## 2024-11-12 DIAGNOSIS — I49.5 SSS (SICK SINUS SYNDROME): Primary | ICD-10-CM

## 2024-11-12 DIAGNOSIS — E11.69 TYPE 2 DIABETES MELLITUS WITH OTHER SPECIFIED COMPLICATION, WITHOUT LONG-TERM CURRENT USE OF INSULIN: ICD-10-CM

## 2024-11-12 DIAGNOSIS — I10 ESSENTIAL HYPERTENSION: ICD-10-CM

## 2024-11-12 PROCEDURE — 1159F MED LIST DOCD IN RCRD: CPT | Performed by: NURSE PRACTITIONER

## 2024-11-12 PROCEDURE — 1160F RVW MEDS BY RX/DR IN RCRD: CPT | Performed by: NURSE PRACTITIONER

## 2024-11-12 PROCEDURE — 3079F DIAST BP 80-89 MM HG: CPT | Performed by: NURSE PRACTITIONER

## 2024-11-12 PROCEDURE — 3075F SYST BP GE 130 - 139MM HG: CPT | Performed by: NURSE PRACTITIONER

## 2024-11-12 PROCEDURE — 99214 OFFICE O/P EST MOD 30 MIN: CPT | Performed by: NURSE PRACTITIONER

## 2024-11-12 PROCEDURE — 93000 ELECTROCARDIOGRAM COMPLETE: CPT | Performed by: NURSE PRACTITIONER

## 2024-11-12 RX ORDER — LOSARTAN POTASSIUM 100 MG/1
100 TABLET ORAL DAILY
Qty: 90 TABLET | Refills: 3 | Status: SHIPPED | OUTPATIENT
Start: 2024-11-12 | End: 2024-11-15 | Stop reason: SDUPTHER

## 2024-11-12 RX ORDER — MAGNESIUM OXIDE 400 MG/1
400 TABLET ORAL DAILY
Qty: 90 TABLET | Refills: 3 | Status: SHIPPED | OUTPATIENT
Start: 2024-11-12 | End: 2024-11-15 | Stop reason: SDUPTHER

## 2024-11-12 RX ORDER — METOPROLOL TARTRATE 25 MG/1
25 TABLET, FILM COATED ORAL 2 TIMES DAILY
Qty: 180 TABLET | Refills: 3 | Status: SHIPPED | OUTPATIENT
Start: 2024-11-12 | End: 2024-11-15 | Stop reason: SDUPTHER

## 2024-11-12 NOTE — PROGRESS NOTES
"Chief Complaint   Patient presents with    Follow-up     Cardiac management    Lab     Last labs in March per PCP.    Pacemaker Check     Last Medtronic PPM interrogation on 5/24/24.    Palpitations     States \"my PVC's have been more active\".    Weight loss     Reports she is now on ozempic for diabetes.    Med Refill     Needs refills on mag ox, amlodipine, and metoprolol tartrate-90 day     Subjective       Brandie Nesbitt is a 73 y.o. female with HTN, hyperlipidemia, PVCs and SSS diagnosed in 2014 when she had a Medtronic pacemaker placed in [place]. She established care in January 2018 after moving to [place]. EKG at consult showed atrial pacing, occasional PVC. Lopressor 25 mg BID added.  Echo stable.  In January 2020, she reported increasing HOYOS. Stress test and echocardiogram showed normal LVEF, no ischemia.  Carotid US showed 50% LICA, normal right. Repeated 5/19/2021 remained stable.     She then went to the hospital after COVID infection in Feb 2023. She reported they told her at work her heart rate was low, despite having a pacemaker, noted to have PVCs. She was noted to be mildly dehydrated at [facility] ER and received fluids and felt better after. Stress and echo were advised. Stress 2/2023 negative for ischemia with good LV function noted. Echo showed no significant valve concerns. Flecainide added for PVC management.      She returns today for follow up. Denies anginal chest pain or dyspnea.  Palpitations occur intermittently, slight increase in frequency.  She admits she only gets PVCs when she is at work.  Recent remote device check showed approximately 107 PVCs per hour.  She does find significant benefit with flecainide.  Ozempic started for A1c 7.1.  She has lost 11 pounds.  Refills requested.  She has approximately 12 months remaining on pacemaker battery.    Labs 3/2024: H/H platelets 245, H/H12.9/39.5, A1c 7.1%, GFR 70, normal LFT, , , HDL 39, LDL 56, TSH 1.77.         Cardiac " History:    Past Surgical History:   Procedure Laterality Date    CARDIOVASCULAR STRESS TEST  01/08/2020    7 Min. 7. METS. 89% THR. BP- 158/84. EF > 70%. Breast Attenuation.    CARDIOVASCULAR STRESS TEST  02/03/2023    EF 59%, no ischemia    ECHO - CONVERTED  01/04/2018    EF 65%. RVSP- 31 mmHg    ECHO - CONVERTED  01/08/2020    EF 60%. LA- 3.9 Cm. Mild- Mod MR. RVSP- 34 mmHg.    ECHO - CONVERTED  12/30/2021    EF 60%. LA- 4.2 Cm. Mild MR. RVSP- 42 mmHg    ECHO - CONVERTED  07/12/2022    EF 65%. LA- 4.0. Trace-Mild MR. RVSP- 28 mmHg    ECHO - CONVERTED  02/03/2023    EF 60%, LVH, PVCs, RVSP 47mmHg, diastolic dysfunction    PACEMAKER IMPLANTATION  11/26/2014    Medtronic @ [facility] CAROTID UNILATERAL  01/02/2020    50% on the left, none on right    US CAROTID UNILATERAL  05/19/2021    <50% bilaterally     Current Outpatient Medications   Medication Sig Dispense Refill    amLODIPine (NORVASC) 5 MG tablet Take 1 tablet by mouth Daily. 90 tablet 3    aspirin 81 MG EC tablet Take 1 tablet by mouth Daily.      atorvastatin (LIPITOR) 10 MG tablet TAKE 1 TABLET DAILY 90 tablet 3    cyanocobalamin (VITAMIN B-12) 2000 MCG tablet Take 1 tablet by mouth Daily.      flecainide (TAMBOCOR) 50 MG tablet Take 1 tablet by mouth Every 12 (Twelve) Hours. 180 tablet 2    glipizide (GLUCOTROL XL) 5 MG ER tablet Take 1 tablet by mouth Daily.      hydroCHLOROthiazide 25 MG tablet TAKE 1 TABLET DAILY 90 tablet 3    losartan (COZAAR) 100 MG tablet Take 1 tablet by mouth Daily. 90 tablet 3    magnesium oxide (MAG-OX) 400 MG tablet Take 1 tablet by mouth Daily. 90 tablet 3    meloxicam (MOBIC) 15 MG tablet Take 1 tablet by mouth Daily As Needed.      metFORMIN ER (GLUCOPHAGE-XR) 500 MG 24 hr tablet Take 1 tablet by mouth Daily With Breakfast.      metoprolol tartrate (LOPRESSOR) 25 MG tablet Take 1 tablet by mouth 2 (Two) Times a Day. 180 tablet 3    oxybutynin XL (DITROPAN-XL) 5 MG 24 hr tablet Take 1 tablet by mouth Daily.       Semaglutide, 1 MG/DOSE, (OZEMPIC) 2 MG/1.5ML solution pen-injector Inject 1 mg under the skin into the appropriate area as directed 1 (One) Time Per Week.       No current facility-administered medications for this visit.     Patient has no known allergies.    Past Medical History:   Diagnosis Date    Breast abrasion     Diabetes mellitus     History of kidney stones     History of lumpectomy     History of tonsillectomy     Hypertension     Pacemaker     Medtronic    Sleep apnea     Tennis elbow      Social History     Socioeconomic History    Marital status: Unknown   Tobacco Use    Smoking status: Never     Passive exposure: Past    Smokeless tobacco: Never   Vaping Use    Vaping status: Never Used   Substance and Sexual Activity    Alcohol use: Yes     Comment: rarely    Drug use: No    Sexual activity: Defer     Family History   Problem Relation Age of Onset    Heart attack Mother     Hypertension Mother     Hyperlipidemia Mother     Cancer Mother     Stroke Father     Hyperlipidemia Father     Hypertension Father     Cancer Father     Heart disease Sister     Diabetes Sister     Stroke Sister     Hypertension Sister     Diabetes Sister     Hypertension Sister     Hypertension Sister     Hypertension Brother     Heart disease Brother     Diabetes Brother     Stroke Brother     Stroke Maternal Grandmother     Heart attack Maternal Grandfather     Heart attack Paternal Grandmother     Stroke Paternal Grandmother     Heart attack Paternal Grandfather     Stroke Paternal Grandfather      Review of Systems   Constitutional: Positive for weight loss (-11). Negative for decreased appetite and malaise/fatigue.   HENT: Negative.     Eyes:  Negative for blurred vision.   Cardiovascular:  Positive for palpitations. Negative for chest pain, dyspnea on exertion, leg swelling and syncope.   Respiratory:  Negative for shortness of breath and sleep disturbances due to breathing.    Endocrine: Negative.   "  Hematologic/Lymphatic: Negative for bleeding problem. Does not bruise/bleed easily.   Skin: Negative.    Musculoskeletal:  Negative for falls and myalgias.   Gastrointestinal:  Negative for abdominal pain, heartburn and melena.   Genitourinary:  Negative for hematuria.   Neurological:  Negative for dizziness and light-headedness.   Psychiatric/Behavioral:  Negative for altered mental status.    Allergic/Immunologic: Negative.       Objective     /82 (BP Location: Right arm, Patient Position: Sitting)   Pulse 75   Ht 157.5 cm (62.01\")   Wt 83.3 kg (183 lb 9.6 oz)   BMI 33.57 kg/m²     Vitals and nursing note reviewed.   Constitutional:       General: Not in acute distress.     Appearance: Well-developed. Not diaphoretic.   Eyes:      Pupils: Pupils are equal, round, and reactive to light.   HENT:      Head: Normocephalic.   Pulmonary:      Effort: Pulmonary effort is normal. No respiratory distress.      Breath sounds: Normal breath sounds.   Cardiovascular:      Normal rate. Regular rhythm.   Pulses:     Intact distal pulses.   Edema:     Peripheral edema absent.   Abdominal:      General: Bowel sounds are normal.      Palpations: Abdomen is soft.   Musculoskeletal: Normal range of motion.      Cervical back: Normal range of motion. Skin:     General: Skin is warm and dry.   Neurological:      Mental Status: Alert and oriented to person, place, and time.          ECG 12 Lead    Date/Time: 11/12/2024 9:09 AM  Performed by: Alisa Morse APRN    Authorized by: Alisa Morse APRN  Comparison: compared with previous ECG   Similar to previous ECG  Rhythm: paced  Rate: normal  BPM: 75  ST Segments: ST segments normal    Clinical impression: non-specific ECG  Comments: Atrial paced  Ventricular sensed              Problem List Items Addressed This Visit          Cardiac and Vasculature    SSS (sick sinus syndrome) - Primary    Relevant Medications    metoprolol tartrate (LOPRESSOR) 25 MG tablet    Essential " hypertension    Relevant Medications    metoprolol tartrate (LOPRESSOR) 25 MG tablet    losartan (COZAAR) 100 MG tablet    Hypercholesteremia    Palpitations    PVC (premature ventricular contraction)    Relevant Medications    metoprolol tartrate (LOPRESSOR) 25 MG tablet       Endocrine and Metabolic    Type 2 diabetes mellitus, without long-term current use of insulin    Relevant Medications    Semaglutide, 1 MG/DOSE, (OZEMPIC) 2 MG/1.5ML solution pen-injector      HTN  -Well-controlled   -Continue metoprolol, losartan, HCTZ, amlodipine 5 mg daily  -Limit Na. Continue weight loss efforts.      YVES  -uncorrected as she does not tolerate CPAP  -Side lying sleep, continue weight loss.      SSS  -EKG is atrial paced   -Normal QT   -Check device at next Medtronic clinic  -She is now monitored remotely-  -she prefers /[place] for device change out when she reaches [place]      PVC  -EKG showed atrial pacing, no PVC   -Remote interrogation recorded 107 PVCs per hour or 1-2 PVCs per minute  -continue flecainide, metoprolol, magnesium       Hyperlipidemia  -Continue Lipitor, LDL at goal at 54.      Diabetes  -A1C continues to trend down, 7.1% in February   -She has lost 11 more pounds with GLP-1 agonist   -She is on aspirin, statin, ARB  -Continue low sugar, low carbohydrate diet.       FU 6 months or sooner if needed.             Electronically signed by TROY Bonilla,  November 12, 2024 09:11 EST

## 2024-11-13 ENCOUNTER — TELEPHONE (OUTPATIENT)
Dept: CARDIOLOGY | Facility: CLINIC | Age: 74
End: 2024-11-13
Payer: MEDICARE

## 2024-11-13 NOTE — TELEPHONE ENCOUNTER
Caller: Brandie Nesbitt    Relationship: Self    Best call back number: [phone]     What is the best time to reach you: ANY     What was the call regarding: PT REPORTS THAT THEY WERE GIVEN A CALL BUT THERE IS NO DOCUMENTATION IN THE CHART ABOUT THIS. IF THIS WAS NOT A MISTAKE, PLEASE CALL PT BACK.     Is it okay if the provider responds through MyChart: CALL

## 2024-11-15 RX ORDER — LOSARTAN POTASSIUM 100 MG/1
100 TABLET ORAL DAILY
Qty: 90 TABLET | Refills: 3 | Status: SHIPPED | OUTPATIENT
Start: 2024-11-15

## 2024-11-15 RX ORDER — METOPROLOL TARTRATE 25 MG/1
25 TABLET, FILM COATED ORAL 2 TIMES DAILY
Qty: 180 TABLET | Refills: 3 | Status: SHIPPED | OUTPATIENT
Start: 2024-11-15

## 2024-11-15 RX ORDER — MAGNESIUM OXIDE 400 MG/1
400 TABLET ORAL DAILY
Qty: 90 TABLET | Refills: 3 | Status: SHIPPED | OUTPATIENT
Start: 2024-11-15

## 2024-11-15 NOTE — TELEPHONE ENCOUNTER
Received fax from [facility] that prescription service is no longer administering the mail service.     Spoke with patient concerning pharmacy. Patient reports that pharmacy did change to [facility]. Patient asking for scripts to [facility] to [facility].    Scripts attached for cosign.

## 2024-11-27 ENCOUNTER — CLINICAL SUPPORT NO REQUIREMENTS (OUTPATIENT)
Dept: CARDIOLOGY | Facility: CLINIC | Age: 74
End: 2024-11-27
Payer: MEDICARE

## 2024-11-27 DIAGNOSIS — R00.2 PALPITATIONS: ICD-10-CM

## 2024-11-27 DIAGNOSIS — I49.5 SSS (SICK SINUS SYNDROME): Primary | ICD-10-CM

## 2025-01-31 ENCOUNTER — TELEPHONE (OUTPATIENT)
Dept: CARDIOLOGY | Facility: CLINIC | Age: 75
End: 2025-01-31
Payer: MEDICARE

## 2025-01-31 NOTE — TELEPHONE ENCOUNTER
Caller: Brandie Nesbitt    Relationship: Self    Best call back number: [phone]    What is the best time to reach you: ANYTIME    Who are you requesting to speak with (clinical staff, provider,  specific staff member): CLINICAL    What was the call regarding: PT HAS QUESTIONS ABOUT PACEMAKER AND DEVICE THAT CONNECTS TO PACEMAKER. PT WANTS TO MAKE SURE OUR OFFICE IS STILL RECEIVING THE INFORMATION FROM DEVICE. PLEASE CALL HER WHEN AVAILABLE TO DISCUSS.

## 2025-01-31 NOTE — TELEPHONE ENCOUNTER
Spoke with patient concerning remote PPM checks. Advised last remote check obtained was 9/1/24 and last interrogation 11/27/24. Patient reports understanding and aware of PPM check in April. She just checking to make sure we was receiving remote checks.

## 2025-02-11 ENCOUNTER — TELEPHONE (OUTPATIENT)
Dept: CARDIOLOGY | Facility: CLINIC | Age: 75
End: 2025-02-11
Payer: MEDICARE

## 2025-02-11 NOTE — TELEPHONE ENCOUNTER
Karen, can we schedule her for an in office device check, next Medtronic clinic date is fine.  She is reaching end-of-life and has missed her remote transmissions.    Thank you

## 2025-02-11 NOTE — TELEPHONE ENCOUNTER
I CALLED AND LEFT A MESSAGE FOR PATIENT TO CALL AND GET APT DATE AND I MAILED HER THE APPOINTMENT REMINDER FOR END OF FEBRUARY.

## 2025-02-11 NOTE — TELEPHONE ENCOUNTER
Missed remote transmission.. Called patient to send transmission. Left message for patient. Have been monitoring battery monthly. Patient missed scheduled January, and February transmission. Suggest in office check to check battery status.     I will send patient another schedule for her to send  manual transmissions

## 2025-02-12 ENCOUNTER — TELEPHONE (OUTPATIENT)
Dept: CARDIOLOGY | Facility: CLINIC | Age: 75
End: 2025-02-12
Payer: MEDICARE

## 2025-02-12 NOTE — TELEPHONE ENCOUNTER
Caller: Brandie Nesbitt    Relationship: Self    Best call back number: [phone]     What is the best time to reach you: ANYTIME. VM OKAY. PT REPORTS THAT CAN EVEN LEAVE VM WITH NEW DATE AND TIME, SHE NEEDS AN 8AM APPT.     Who are you requesting to speak with (clinical staff, provider,  specific staff member): PROVIDER    Do you know the name of the person who called: NA    What was the call regarding: PT RECEIVED A VM STATING TROY LOPEZ WILL NOT BE IN OFFICE 6.18.25, PT NEEDS 8AM APPT AND REQUESTED TO BE SEEN IN MAY IF POSSIBLE SINCE WE ARE MOVING THE APPT. ARE WE ABLE TO ACCOMMODATE THIS? NO APPTS AVAIL AT 8 WITH TROY LOPEZ PAST JULY. PLEASE ADVISE.     Is it okay if the provider responds through MyChart: NO

## 2025-02-12 NOTE — TELEPHONE ENCOUNTER
HUB PLEASE ADVISE PATIENT OF THE NEW APT DATE AND TIME IF THIS DOES NOT WORK R/S FOR THE NEXT 8AM WITH MARKO AVAILABLE

## 2025-03-01 NOTE — TELEPHONE ENCOUNTER
Patient left message wanting to know how many PVCs noted on her last PPM interrogation 11/25/2020?   show

## 2025-03-05 ENCOUNTER — OFFICE VISIT (OUTPATIENT)
Dept: CARDIOLOGY | Facility: CLINIC | Age: 75
End: 2025-03-05
Payer: MEDICARE

## 2025-03-05 DIAGNOSIS — I49.5 SSS (SICK SINUS SYNDROME): Primary | ICD-10-CM

## 2025-03-05 DIAGNOSIS — R00.2 PALPITATIONS: ICD-10-CM

## 2025-03-21 ENCOUNTER — TELEPHONE (OUTPATIENT)
Dept: CARDIOLOGY | Facility: CLINIC | Age: 75
End: 2025-03-21
Payer: MEDICARE

## 2025-03-21 NOTE — TELEPHONE ENCOUNTER
Last remote transmission was received 09/2024. Called patient and left message. At last in-office check had est 5 months on device.

## 2025-04-02 ENCOUNTER — TELEPHONE (OUTPATIENT)
Dept: CARDIOLOGY | Facility: CLINIC | Age: 75
End: 2025-04-02
Payer: MEDICARE

## 2025-04-02 RX ORDER — FUROSEMIDE 20 MG/1
20 TABLET ORAL DAILY
Qty: 30 TABLET | Refills: 11 | Status: SHIPPED | OUTPATIENT
Start: 2025-04-02

## 2025-04-02 NOTE — TELEPHONE ENCOUNTER
Caller: Brandie Nesbitt     Relationship: SELF     Best call back number: [phone]    What is your medical concern? BOTH  LEGS, FEET AND ANKLES SWELLING .    How long has this issue been going on? SEVERAL MONTHS AND GETTING WORSE    Is your provider already aware of this issue? NO    Have you been treated for this issue? NO PATIENT DID CALL PCP BUT HAS NOT HEARD BACK.    PLEASE REACH OUT.

## 2025-04-02 NOTE — TELEPHONE ENCOUNTER
She can try Lasix 20 mg 1 to 2 tablets daily as needed.  Ask her to start with 1 tablet and see how that works.  She may continue HCTZ.  Make sure she is drinking enough water.  If she requires 2 tablets, will need to take potassium supplement also.

## 2025-04-02 NOTE — TELEPHONE ENCOUNTER
Spoke with patient concerning swelling. She reports having swelling in legs, ankles and feet for the last few months and it has worsened recently. She is having pitting edema, has also noticed rash from time to time on legs. No worsening shortness of breath. She does try to elevate legs at work. It is some better in the mornings.

## 2025-04-04 NOTE — TELEPHONE ENCOUNTER
Patient made aware can try Lasix 20 mg one to two tablets daily as needed. Aware to start with one tablet and see how that works. Continue HCTZ, make sure to drink enough water. Also aware if requires two tablets will need to take potassium supplement. Patient voiced understanding.

## 2025-04-17 ENCOUNTER — TELEPHONE (OUTPATIENT)
Dept: CARDIOLOGY | Facility: CLINIC | Age: 75
End: 2025-04-17
Payer: MEDICARE

## 2025-04-17 NOTE — TELEPHONE ENCOUNTER
Missed remote transmission, called patient to request remote transmission. Left voicemail on [phone] number to send manual transmission.

## 2025-04-18 ENCOUNTER — TELEPHONE (OUTPATIENT)
Dept: CARDIOLOGY | Facility: CLINIC | Age: 75
End: 2025-04-18
Payer: MEDICARE

## 2025-04-18 NOTE — TELEPHONE ENCOUNTER
Good Morning. I have been trying to reach this patient to have her send a remote transmission because she is nearing OH. The last time she sent a manual transmission was 09/01/2024, I can never get her to send one. She came in last month and had it checked in-office, and had less than 5 months on the battery.  I see she called 04/02/25 with edema.  Her battery voltage was 2.86v at the time and oh is 2.83V. I suspect she may have fipped OH. We see it a lot with the [employer] and [employer]pta that the battery does not last the est longevity when the device gets older.  She is dependant, so it may be wise to have her come in for a in-office check.  I can not get her to return my calls or answer my calls. I suspect my phone number is coming up as spam for her.

## 2025-04-18 NOTE — TELEPHONE ENCOUNTER
I called and spoke with patient regarding her needing to send remote transmission. Advised of the importance of being compliant with risks explained since she is PPM dependent. Understanding voiced. I gave her Sarah's number, she is going to call her now. Sarah aware.

## 2025-04-21 ENCOUNTER — TELEPHONE (OUTPATIENT)
Dept: CARDIOLOGY | Facility: CLINIC | Age: 75
End: 2025-04-21
Payer: MEDICARE

## 2025-04-21 DIAGNOSIS — I49.5 SSS (SICK SINUS SYNDROME): Primary | ICD-10-CM

## 2025-04-21 DIAGNOSIS — I49.3 PVC (PREMATURE VENTRICULAR CONTRACTION): ICD-10-CM

## 2025-04-21 NOTE — TELEPHONE ENCOUNTER
Caller: Brandie Nesbitt    Relationship to patient: Self    Best call back number: [phone]    Chief complaint: PATIENT WANTS TO BE SEEN SOONER, AS THEY WERE TOLD THEIR PACE MAKER HAS UNDER A MONTH LEFT AND NEED IT WORKED ON PLEASE ADVISE.    Type of visit: FOLLOW-UP    Requested date: ASAP     If rescheduling, when is the original appointment: 6.18.25

## 2025-04-21 NOTE — TELEPHONE ENCOUNTER
Caller: Brandie Nesbitt    Relationship: Self    Best call back number: [phone]    What is the best time to reach you: ANY    What was the call regarding: PATIENT ADVISED HER METRONIC PACEMAKER IS DOWN TO 1 MONTH OF LIFE PER THE COMPANY FROM HER MONITOR. SHE WOULD LIKE TO SPEAK WITH SOMEONE ABOUT GETTING IN WITH ONE OF THE DOCTORS THAT COME TO THIS OFFICE. SHE DOES NOT WANT TO TRAVEL TO [place]. PLEASE CALL HER ASAP TO ADVISE.     Is it okay if the provider responds through MyChart: NO

## 2025-04-22 NOTE — TELEPHONE ENCOUNTER
Referred to  Dr. Alejo for generator change    Medtronic device, 4 month battery on remote check 4/21/25    SSS, PVC

## 2025-04-28 ENCOUNTER — TELEPHONE (OUTPATIENT)
Dept: CARDIOLOGY | Facility: CLINIC | Age: 75
End: 2025-04-28
Payer: MEDICARE

## 2025-04-28 NOTE — TELEPHONE ENCOUNTER
Spoke with patient concerning BP readings and symptoms. Patient reports for the last week her BP has been low 88/62,101/67, 90/61, 88/60. Has been encouraged to drink adequate water, reports she is trying to do better with drinking water. She came home early from work today, not feeling well. She has been short of breath, intermittent tightness in chest with soreness for the last week. Medications remain same as last visit except PCP had increased amlodipine to 10 mg daily since last visit.

## 2025-04-28 NOTE — TELEPHONE ENCOUNTER
Patient made aware to reduce amlodipine back to 5 mg, lets see if once BP gets back to normal her symptoms subside. Patient aware to call office and let us know in a couple of days if she is not feeling better. If symptoms persist needs repeat cardiac work up. Understanding voiced.

## 2025-04-28 NOTE — TELEPHONE ENCOUNTER
Patient called in and the HUB transferred the call to the office. Her blood pressure was 88/60 and pulse was 72. She said it had been that for over a week. Patient was advised to increase her fluid intake (drink a bottle of water)  and that I would have a nurse give her a call when I had one available.

## 2025-04-28 NOTE — TELEPHONE ENCOUNTER
Reduce amlodipine back to 5 mg.  Lets see if once her blood pressure gets back to normal her symptoms subside.    If symptoms persist, she needs repeat cardiac workup with stress test and echo.  Ask her to let us know in a couple days if she is not feeling better.

## 2025-05-01 DIAGNOSIS — I49.5 SSS (SICK SINUS SYNDROME): Primary | ICD-10-CM

## 2025-05-06 DIAGNOSIS — I49.5 SSS (SICK SINUS SYNDROME): Primary | ICD-10-CM

## 2025-05-06 RX ORDER — ACETAMINOPHEN 325 MG/1
650 TABLET ORAL EVERY 4 HOURS PRN
OUTPATIENT
Start: 2025-05-06

## 2025-05-06 RX ORDER — NITROGLYCERIN 0.4 MG/1
0.4 TABLET SUBLINGUAL
OUTPATIENT
Start: 2025-05-06

## 2025-05-06 RX ORDER — SODIUM CHLORIDE 9 MG/ML
40 INJECTION, SOLUTION INTRAVENOUS AS NEEDED
OUTPATIENT
Start: 2025-05-06

## 2025-05-06 RX ORDER — SODIUM CHLORIDE 0.9 % (FLUSH) 0.9 %
3 SYRINGE (ML) INJECTION EVERY 12 HOURS SCHEDULED
OUTPATIENT
Start: 2025-05-06

## 2025-05-06 RX ORDER — SODIUM CHLORIDE 0.9 % (FLUSH) 0.9 %
10 SYRINGE (ML) INJECTION AS NEEDED
OUTPATIENT
Start: 2025-05-06

## 2025-05-06 RX ORDER — ONDANSETRON 2 MG/ML
4 INJECTION INTRAMUSCULAR; INTRAVENOUS EVERY 6 HOURS PRN
OUTPATIENT
Start: 2025-05-06

## 2025-05-28 ENCOUNTER — HOSPITAL ENCOUNTER (OUTPATIENT)
Facility: HOSPITAL | Age: 75
Discharge: HOME | End: 2025-05-28
Attending: INTERNAL MEDICINE | Admitting: INTERNAL MEDICINE
Payer: MEDICARE

## 2025-05-28 VITALS
HEIGHT: 62 IN | OXYGEN SATURATION: 97 % | HEART RATE: 69 BPM | DIASTOLIC BLOOD PRESSURE: 73 MMHG | SYSTOLIC BLOOD PRESSURE: 130 MMHG | RESPIRATION RATE: 11 BRPM | WEIGHT: 162.6 LBS | BODY MASS INDEX: 29.92 KG/M2 | TEMPERATURE: 96.4 F

## 2025-05-28 DIAGNOSIS — I49.5 SSS (SICK SINUS SYNDROME): ICD-10-CM

## 2025-05-28 LAB
ANION GAP SERPL CALCULATED.3IONS-SCNC: 11 MMOL/L (ref 5–15)
BUN SERPL-MCNC: 17.7 MG/DL (ref 8–23)
BUN/CREAT SERPL: 23 (ref 7–25)
CALCIUM SPEC-SCNC: 9.1 MG/DL (ref 8.6–10.5)
CHLORIDE SERPL-SCNC: 105 MMOL/L (ref 98–107)
CO2 SERPL-SCNC: 26 MMOL/L (ref 22–29)
CREAT SERPL-MCNC: 0.77 MG/DL (ref 0.57–1)
DEPRECATED RDW RBC AUTO: 43.6 FL (ref 37–54)
EGFRCR SERPLBLD CKD-EPI 2021: 82.1 ML/MIN/1.73
ERYTHROCYTE [DISTWIDTH] IN BLOOD BY AUTOMATED COUNT: 14.1 % (ref 12.3–15.4)
GLUCOSE BLDC GLUCOMTR-MCNC: 93 MG/DL (ref 70–130)
GLUCOSE SERPL-MCNC: 93 MG/DL (ref 65–99)
HCT VFR BLD AUTO: 39.8 % (ref 34–46.6)
HGB BLD-MCNC: 13.1 G/DL (ref 12–15.9)
MAGNESIUM SERPL-MCNC: 1.7 MG/DL (ref 1.6–2.4)
MCH RBC QN AUTO: 27.8 PG (ref 26.6–33)
MCHC RBC AUTO-ENTMCNC: 32.9 G/DL (ref 31.5–35.7)
MCV RBC AUTO: 84.5 FL (ref 79–97)
PLATELET # BLD AUTO: 219 10*3/MM3 (ref 140–450)
PMV BLD AUTO: 10.3 FL (ref 6–12)
POTASSIUM SERPL-SCNC: 3.3 MMOL/L (ref 3.5–5.2)
RBC # BLD AUTO: 4.71 10*6/MM3 (ref 3.77–5.28)
SODIUM SERPL-SCNC: 142 MMOL/L (ref 136–145)
WBC NRBC COR # BLD AUTO: 7.61 10*3/MM3 (ref 3.4–10.8)

## 2025-05-28 PROCEDURE — 85027 COMPLETE CBC AUTOMATED: CPT | Performed by: NURSE PRACTITIONER

## 2025-05-28 PROCEDURE — 25010000002 CEFAZOLIN PER 500 MG: Performed by: NURSE PRACTITIONER

## 2025-05-28 PROCEDURE — 25010000002 ONDANSETRON PER 1 MG: Performed by: INTERNAL MEDICINE

## 2025-05-28 PROCEDURE — 83735 ASSAY OF MAGNESIUM: CPT | Performed by: NURSE PRACTITIONER

## 2025-05-28 PROCEDURE — C1785 PMKR, DUAL, RATE-RESP: HCPCS | Performed by: INTERNAL MEDICINE

## 2025-05-28 PROCEDURE — 99153 MOD SED SAME PHYS/QHP EA: CPT | Performed by: INTERNAL MEDICINE

## 2025-05-28 PROCEDURE — 99152 MOD SED SAME PHYS/QHP 5/>YRS: CPT | Performed by: INTERNAL MEDICINE

## 2025-05-28 PROCEDURE — 25010000002 FENTANYL CITRATE (PF) 50 MCG/ML SOLUTION: Performed by: INTERNAL MEDICINE

## 2025-05-28 PROCEDURE — 82948 REAGENT STRIP/BLOOD GLUCOSE: CPT

## 2025-05-28 PROCEDURE — 25010000002 LIDOCAINE 1 % SOLUTION: Performed by: INTERNAL MEDICINE

## 2025-05-28 PROCEDURE — 80048 BASIC METABOLIC PNL TOTAL CA: CPT | Performed by: NURSE PRACTITIONER

## 2025-05-28 PROCEDURE — 93005 ELECTROCARDIOGRAM TRACING: CPT | Performed by: INTERNAL MEDICINE

## 2025-05-28 PROCEDURE — 36415 COLL VENOUS BLD VENIPUNCTURE: CPT

## 2025-05-28 PROCEDURE — 33228 REMV&REPLC PM GEN DUAL LEAD: CPT | Performed by: INTERNAL MEDICINE

## 2025-05-28 PROCEDURE — 25010000002 MIDAZOLAM PER 1 MG: Performed by: INTERNAL MEDICINE

## 2025-05-28 PROCEDURE — 93010 ELECTROCARDIOGRAM REPORT: CPT | Performed by: INTERNAL MEDICINE

## 2025-05-28 DEVICE — GEN PM AZURE S SURESCAN DR MRI: Type: IMPLANTABLE DEVICE | Site: CHEST WALL | Status: FUNCTIONAL

## 2025-05-28 RX ORDER — SODIUM CHLORIDE 9 MG/ML
40 INJECTION, SOLUTION INTRAVENOUS AS NEEDED
Status: DISCONTINUED | OUTPATIENT
Start: 2025-05-28 | End: 2025-05-28 | Stop reason: HOSPADM

## 2025-05-28 RX ORDER — LIDOCAINE HYDROCHLORIDE 10 MG/ML
INJECTION, SOLUTION INFILTRATION; PERINEURAL
Status: DISCONTINUED | OUTPATIENT
Start: 2025-05-28 | End: 2025-05-28 | Stop reason: HOSPADM

## 2025-05-28 RX ORDER — ETOMIDATE 2 MG/ML
INJECTION INTRAVENOUS
Status: DISCONTINUED | OUTPATIENT
Start: 2025-05-28 | End: 2025-05-28 | Stop reason: HOSPADM

## 2025-05-28 RX ORDER — MIDAZOLAM HYDROCHLORIDE 1 MG/ML
INJECTION, SOLUTION INTRAMUSCULAR; INTRAVENOUS
Status: DISCONTINUED | OUTPATIENT
Start: 2025-05-28 | End: 2025-05-28 | Stop reason: HOSPADM

## 2025-05-28 RX ORDER — ONDANSETRON 2 MG/ML
4 INJECTION INTRAMUSCULAR; INTRAVENOUS EVERY 6 HOURS PRN
Status: DISCONTINUED | OUTPATIENT
Start: 2025-05-28 | End: 2025-05-28 | Stop reason: HOSPADM

## 2025-05-28 RX ORDER — NITROGLYCERIN 0.4 MG/1
0.4 TABLET SUBLINGUAL
Status: DISCONTINUED | OUTPATIENT
Start: 2025-05-28 | End: 2025-05-28 | Stop reason: HOSPADM

## 2025-05-28 RX ORDER — SODIUM CHLORIDE 0.9 % (FLUSH) 0.9 %
10 SYRINGE (ML) INJECTION AS NEEDED
Status: DISCONTINUED | OUTPATIENT
Start: 2025-05-28 | End: 2025-05-28 | Stop reason: HOSPADM

## 2025-05-28 RX ORDER — SODIUM CHLORIDE 0.9 % (FLUSH) 0.9 %
3 SYRINGE (ML) INJECTION EVERY 12 HOURS SCHEDULED
Status: DISCONTINUED | OUTPATIENT
Start: 2025-05-28 | End: 2025-05-28 | Stop reason: HOSPADM

## 2025-05-28 RX ORDER — FENTANYL CITRATE 50 UG/ML
INJECTION, SOLUTION INTRAMUSCULAR; INTRAVENOUS
Status: DISCONTINUED | OUTPATIENT
Start: 2025-05-28 | End: 2025-05-28 | Stop reason: HOSPADM

## 2025-05-28 RX ORDER — ACETAMINOPHEN 325 MG/1
650 TABLET ORAL EVERY 4 HOURS PRN
Status: DISCONTINUED | OUTPATIENT
Start: 2025-05-28 | End: 2025-05-28 | Stop reason: HOSPADM

## 2025-05-28 RX ORDER — ONDANSETRON 2 MG/ML
INJECTION INTRAMUSCULAR; INTRAVENOUS
Status: DISCONTINUED | OUTPATIENT
Start: 2025-05-28 | End: 2025-05-28 | Stop reason: HOSPADM

## 2025-05-28 RX ADMIN — SODIUM CHLORIDE 2000 MG: 900 INJECTION INTRAVENOUS at 16:12

## 2025-05-28 NOTE — H&P
Patient Care Team:  Vidhi Marion APRN as PCP - General (Family Medicine)  Alisa Morse APRN as Nurse Practitioner (Nurse Practitioner)    Chief complaint sick sinus syndrome, pacemaker battery end of life     Subjective     History of Present Illness  The patient is a 74-year-old female with a history of sick sinus syndrome, hypertension, hyperlipidemia, PVCs.  She was previously followed by cardiology in [facility] where she had a Medtronic dual chamber permanent pacemaker implantation. Stress 2/2023 negative for ischemia with good LV function noted. Echo showed no significant valve concerns. Flecainide added for PVC management.  EKG done after addition showed atrial pacing and no PVCs. Labs done 2/3/2023 showed: Na 140, K 4.3, Creatinine 0.98, BUN 22, Mag 2.0, TSH 3.5, HH 14.1/42.9.     Recent device interrogation noted her pacemaker was near end of life. He presents today for generator change.         Past Medical History:   Diagnosis Date    Breast abrasion     Diabetes mellitus     History of kidney stones     History of lumpectomy     History of tonsillectomy     Hypertension     Pacemaker     Medtronic    Sleep apnea     Tennis elbow      Past Surgical History:   Procedure Laterality Date    CARDIOVASCULAR STRESS TEST  01/08/2020    7 Min. 7. METS. 89% THR. BP- 158/84. EF > 70%. Breast Attenuation.    CARDIOVASCULAR STRESS TEST  02/03/2023    EF 59%, no ischemia    ECHO - CONVERTED  01/04/2018    EF 65%. RVSP- 31 mmHg    ECHO - CONVERTED  01/08/2020    EF 60%. LA- 3.9 Cm. Mild- Mod MR. RVSP- 34 mmHg.    ECHO - CONVERTED  12/30/2021    EF 60%. LA- 4.2 Cm. Mild MR. RVSP- 42 mmHg    ECHO - CONVERTED  07/12/2022    EF 65%. LA- 4.0. Trace-Mild MR. RVSP- 28 mmHg    ECHO - CONVERTED  02/03/2023    EF 60%, LVH, PVCs, RVSP 47mmHg, diastolic dysfunction    PACEMAKER IMPLANTATION  11/26/2014    Medtronic @ [facility]    US CAROTID UNILATERAL  01/02/2020    50% on the left, none on right    US  CAROTID UNILATERAL  05/19/2021    <50% bilaterally     Family History   Problem Relation Age of Onset    Heart attack Mother     Hypertension Mother     Hyperlipidemia Mother     Cancer Mother     Stroke Father     Hyperlipidemia Father     Hypertension Father     Cancer Father     Heart disease Sister     Diabetes Sister     Stroke Sister     Hypertension Sister     Diabetes Sister     Hypertension Sister     Hypertension Sister     Hypertension Brother     Heart disease Brother     Diabetes Brother     Stroke Brother     Stroke Maternal Grandmother     Heart attack Maternal Grandfather     Heart attack Paternal Grandmother     Stroke Paternal Grandmother     Heart attack Paternal Grandfather     Stroke Paternal Grandfather      Social History     Tobacco Use    Smoking status: Never     Passive exposure: Past    Smokeless tobacco: Never   Vaping Use    Vaping status: Never Used   Substance Use Topics    Alcohol use: Yes     Comment: rarely    Drug use: No       Objective      Vital Signs       Physical Exam  Vitals reviewed.   Constitutional:       General: She is not in acute distress.     Appearance: Normal appearance. She is normal weight. She is not ill-appearing.   HENT:      Head: Normocephalic and atraumatic.      Mouth/Throat:      Mouth: Mucous membranes are moist.      Pharynx: Oropharynx is clear.   Eyes:      Extraocular Movements: Extraocular movements intact.      Conjunctiva/sclera: Conjunctivae normal.      Pupils: Pupils are equal, round, and reactive to light.   Cardiovascular:      Rate and Rhythm: Normal rate and regular rhythm.      Pulses: Normal pulses.      Heart sounds: Normal heart sounds.   Pulmonary:      Effort: Pulmonary effort is normal. No respiratory distress.      Breath sounds: Normal breath sounds.   Abdominal:      General: Abdomen is flat. Bowel sounds are normal.      Palpations: Abdomen is soft.   Musculoskeletal:      Right lower leg: No edema.      Left lower leg: No  edema.   Skin:     General: Skin is warm and dry.   Neurological:      General: No focal deficit present.      Mental Status: She is alert and oriented to person, place, and time. Mental status is at baseline.   Psychiatric:         Mood and Affect: Mood normal.         Behavior: Behavior normal.         Thought Content: Thought content normal.         Results Review:    I reviewed the patient's new clinical results.      Assessment & Plan   Sick sinus syndrome  - Recent device interrogation noted her pacemaker was near end of life. He presents today for generator change.   She is not on any blood thinning agents that she does not have any medication.  She does take a baby aspirin a day her last dose was this morning.    The patient and I made a mutually shared decision ( shared decision making model ) that the patient would be best served by proceeding with the above invasive heart procedure.  This is a high risk invasive cardiac procedure which comes with significant risk of morbidity and mortality.  This patient has significant underlying  heart disease.  Brandie Nesbitt understands these risks and   has made an informed decision to proceed.      TROY Kelsey  Electrophysiology  [facility]           TROY Kelsey  Electrophysiology  [facility]

## 2025-05-28 NOTE — OP NOTE
Cardiac Electrophysiology Procedure Note      [facility]    PROCEDURE: PACEMAKER GENERATOR REPLACEMENT //     OPERATION PERFORMED:     1. Explantation of MDT pulse generator with serial number [id number].  2. Testing of retained pacing leads:        A. Atrial lead MDT 5076, 52 cm, [id number].        B. Right ventricular lead MDT 5076, 58 cm, [id number].    3. Implantation of MDT  pulse generator with serial number [id number].    ATTENDING SURGEON: Mk Alejo DO    MODERATE SEDATION FOR PROCEDURE:    Moderate sedation was given during this procedure.    I supervised and directed RN to administer this sedation.  This staff member also monitored the patient's hemodynamic and respiratory status and response to these medications.  Please see the full detailed procedure report generated by the electrophysiology laboratory staff.  The patient tolerated moderate sedation well.  There were no complications regarding sedation.  The total dose of fentanyl was 50 mcg and the total dose of midazolam was 4 mg.  The total dose of Brevital was 0.  First sedation was administered at 431 pm and continued through 501 pm.    ESTIMATED BLOOD LOSS: less than 20cc    COMPLICATIONS: None    TIME OUT: Time out was completed with verification of the correct patient identity, procedure to be performed, procedure site and implanted equipment.    INDICATION FOR PROCEDURE:  Briefly,Brandie Nesbitt is a 74 y.o. female with a history of sinus node dysfunction who was initially implanted with a dual chamber pacemaker remotely.  The patient was recently seen in our device clinic at which time the patient's device was discovered to be at the elective replacement interval.  The patient was deemed appropriate for a generator replacement.    The patient was able to give written informed consent after revisiting the key portions of the risk versus benefit profile of the procedure.  This discussion was  framed by our lengthy conversations  (please see our detailed notes).  Patient verbalized strong understanding of this discussion and a strong desire to proceed with the procedure.  Please note that this detailed informed consent process utilized mutual and shared decision making process between all parties involved, principally the physician and patient, but also potentially with input from the patient's selected family and friends.    PROCEDURE AND FINDINGS:  The patient was brought to the electrophysiology suite in a post absorptive state.  Informed consent was obtained prior to the procedure and confirmed.  Intravenous prophylactic antibiotics were administered and confirmed to be completely infused prior to the start of the procedure.  After the site of implantation was prepped and draped in the usual sterile fashion and after adequate anesthesia was given, the skin was infiltrated with 1% lidocaine and 0.5% bupivicaine 50/50 mixture.  The skin was incised with a #10 scalpel.  Blunt and electrosurgical dissection was carried out to the pulse generator pocket.  The leads were carefully isolated with blunt and electrosurgical dissection.  Careful attention was paid not to damage the leads.  The pulse generator was explanted and the pocket was copiously irrigated with antibiotic containing normal saline and subsequently observed.  Once adequate hemostasis was confirmed within the pocket, the leads were detached from the pulse generator.  The leads were tested for adequate sensing, impedances and pacing thresholds.  The lead tips for all leads were cleaned and dried thoroughly.  The leads were attached to the appropriate ports on the new pulse generator.  Tug testing was performed on all connections.  The device and leads were placed within the pocket such that the coiled redundant leads were posterior to the pulse generator.  Once again, the device was tested for adequate sensing, impedances and pacing  thresholds.  The pulse generator was then sutured to the fascia in a medial location within the pocket using non absorbable suture.  The pocket was closed with two layers of suture using 2-0 then 3-0 Vicryl, followed by a layer of surgical adhesive and finally a sterile occlusive dressing.                     MEASURED DEVICE DATA:    Atrial lead                   sensing:                 NA                   impedance:           551 Ohms                   Threshold:            0.5 Volts at 0.4 ms    RV lead                   sensin.9 mV                   pacing impedance:    570 Ohms                   Threshold:                  0.5 Volts at 0.4 ms                                                        PROGRAMMED PARAMETERS:    Mode:                                 AAIR / DDDR  Lower Rate:                       70 pulses per minute  Upper Sensor Rate:          130 pulses per minute  Upper Tracking Rate:      130 pulses per minute  Mode Switch Rate:           171 bpm      CONCLUSION:  Successful pacemaker generator change.      Patient is to follow up with our clinic in approximately one week and then myself in 3 months.    No lovenox or heparin at any dose is to be given.    FOR THE PATIENT    Please do not lift more than 10 pounds or abduct the shoulder joint / or raise the arm above the shoulder for 6 weeks after device was implanted (this does not apply to subcutaneous ICDs).    Avoid activities that involve heavy lifting or rough contact that could result in blows to your implant site and to allow your incision time to heal.    No shower or getting device incision wet for 2 days post-operatively.    Keep wound exposed to air unless otherwise instructed, the surgical glue is the bandage.    No creams, lotions, or powders to incision.    Please avoid allowing bra strap or suspenders to lay over incision until completely healed.    Avoid hot tubs or pools until incision completely  healed.    No driving for 24 hours post-operatively after device implant.    Call your doctor if you have any swelling, redness or discharge around your incision, notice anything unusual or unexpected, or you develop a fever that does not go away in two or three days.    Call your doctor if you hear any beeping sounds / vibratory alerts from your device as this indicates your device needs to be checked immediately.    Carry your Medical Device ID Card with you at all times.  Please call our office () with any questions about the device or the wounds.            Mk Alejo, DO, FACC, RS  Cardiac Electrophysiologist  [facility] / [facility]

## 2025-05-28 NOTE — DISCHARGE INSTRUCTIONS
DR. REYNOLDS DEVICE GENERATOR CHANGE    Please do not lift more than 10 pounds or raise the affected arm above the shoulder for 2 weeks after the device was implanted (this does not apply to subcutaneous ICDs).  Avoid activities that involve heavy lifting or rough contact that could result in blows to your implant site. This allows the incision time to heal.  No showering or getting the incision wet for 3 days post-operatively.   Keep the wound exposed to air unless otherwise indicated, the surgical glue is the bandage.   Do not apply creams, lotions or powders to the incision.   Please avoid allowing a bra strap or suspenders to lay over the incision until it is completely healed.   No driving for 24 hours post-operatively.   Call your doctor if you have any swelling, redness or discharge around your incision, notice anything unusual or unexpected or you develop a fever that does not go away in two to three days.   Take Tylenol and ibuprofen for pain control.  Call your doctor if you hear any beeping sounds/vibratory alerts from your device as this indicates your device needs to be checked immediately.  You should be scheduled for a wound check in 1 week.   Carry your medical device ID card with you at all times.   Please call our office at [phone] with any questions about the device or incision.

## 2025-05-29 ENCOUNTER — TELEPHONE (OUTPATIENT)
Dept: CARDIOLOGY | Facility: CLINIC | Age: 75
End: 2025-05-29
Payer: MEDICARE

## 2025-05-29 NOTE — TELEPHONE ENCOUNTER
Patient called and left a message. I tried to call her back but she did not answer.I left a message for her to call us back at the office.

## 2025-05-30 NOTE — TELEPHONE ENCOUNTER
I called to f/u with the patient. She just had questions about post procedure instructions. She also wanted to possibly change the time of one of her appointments. I transferred her to scheduling.

## 2025-05-31 LAB
QT INTERVAL: 470 MS
QTC INTERVAL: 517 MS

## 2025-06-02 ENCOUNTER — TELEPHONE (OUTPATIENT)
Dept: CARDIOLOGY | Facility: CLINIC | Age: 75
End: 2025-06-02

## 2025-06-02 NOTE — TELEPHONE ENCOUNTER
Caller: Brandie Nesbitt    Relationship: Self    Best call back number: [phone]    What is the best time to reach you: ANYTIME    What was the call regarding: PT HAD HER PACEMAKER CHANGED ON 05.28.25. SHE WAS WEARING A NECKLACE, A SILVER HEART SHAPED CHARM WITH DIAMONDS IN IT. AFTER SHE CAME HOME SHE NOTICED IT WAS MISSING. SHE WOULD LIKE TO ASK IF IT WAS FOUND AFTER HER SURGERY. PLEASE REACH OUT TO THE PT TO ADDRESS THIS.     Is it okay if the provider responds through MyChart:

## 2025-06-03 NOTE — TELEPHONE ENCOUNTER
I called [facility] and security in the hospital and they do not have a necklace with that description.

## 2025-06-06 ENCOUNTER — OFFICE VISIT (OUTPATIENT)
Dept: CARDIOLOGY | Facility: CLINIC | Age: 75
End: 2025-06-06
Payer: MEDICARE

## 2025-06-06 DIAGNOSIS — I49.5 SSS (SICK SINUS SYNDROME): Primary | ICD-10-CM

## 2025-06-06 NOTE — PROGRESS NOTES
2025    Brandie Nesbitt, : 1950      Fever: No    Temperature if indicated:     Wound Location: Left Infraclavicular    Dressing Removed: Removed by MA/RN      Old Dressing Appearance:  Clean, dry    Wound Appearance: Redness []                  Drainage []                  Culture obtained []        Color: Clear     Consistency:      Amount: none         Gloves used, wound cleansed with sterile 4x4 and peroxide []       MD notified []     MD orders:     Antibiotic started []      If checked, type     Other:       Appointment for follow-up scheduled for 3 months post procedure []    Future Appointments   Date Time Provider Department Center   2025  8:00 AM Alisa Morse APRN [facility]   2025 10:45 AM Mk Alejo DO [facility]           Della Lala MA, 27      MD Signature:______________________________ Completed By/Date:

## 2025-06-18 ENCOUNTER — OFFICE VISIT (OUTPATIENT)
Dept: CARDIOLOGY | Facility: CLINIC | Age: 75
End: 2025-06-18
Payer: MEDICARE

## 2025-06-18 VITALS
DIASTOLIC BLOOD PRESSURE: 80 MMHG | HEART RATE: 76 BPM | BODY MASS INDEX: 29.7 KG/M2 | WEIGHT: 161.4 LBS | HEIGHT: 62 IN | SYSTOLIC BLOOD PRESSURE: 118 MMHG

## 2025-06-18 DIAGNOSIS — R42 DIZZINESS: ICD-10-CM

## 2025-06-18 DIAGNOSIS — I10 ESSENTIAL HYPERTENSION: ICD-10-CM

## 2025-06-18 DIAGNOSIS — E78.00 HYPERCHOLESTEREMIA: Primary | ICD-10-CM

## 2025-06-18 DIAGNOSIS — I49.5 SSS (SICK SINUS SYNDROME): ICD-10-CM

## 2025-06-18 DIAGNOSIS — R00.2 PALPITATIONS: ICD-10-CM

## 2025-06-18 PROCEDURE — 93000 ELECTROCARDIOGRAM COMPLETE: CPT | Performed by: NURSE PRACTITIONER

## 2025-06-18 PROCEDURE — 3079F DIAST BP 80-89 MM HG: CPT | Performed by: NURSE PRACTITIONER

## 2025-06-18 PROCEDURE — 99214 OFFICE O/P EST MOD 30 MIN: CPT | Performed by: NURSE PRACTITIONER

## 2025-06-18 PROCEDURE — 1159F MED LIST DOCD IN RCRD: CPT | Performed by: NURSE PRACTITIONER

## 2025-06-18 PROCEDURE — 1160F RVW MEDS BY RX/DR IN RCRD: CPT | Performed by: NURSE PRACTITIONER

## 2025-06-18 PROCEDURE — 3074F SYST BP LT 130 MM HG: CPT | Performed by: NURSE PRACTITIONER

## 2025-06-18 RX ORDER — CHOLECALCIFEROL (VITAMIN D3) 25 MCG
1000 TABLET ORAL DAILY
COMMUNITY

## 2025-06-18 RX ORDER — HYDROCHLOROTHIAZIDE 25 MG/1
25 TABLET ORAL DAILY
Qty: 90 TABLET | Refills: 3 | Status: SHIPPED | OUTPATIENT
Start: 2025-06-18

## 2025-06-18 RX ORDER — METOPROLOL TARTRATE 25 MG/1
25 TABLET, FILM COATED ORAL 2 TIMES DAILY
Qty: 180 TABLET | Refills: 3 | Status: SHIPPED | OUTPATIENT
Start: 2025-06-18

## 2025-06-18 RX ORDER — LOSARTAN POTASSIUM 100 MG/1
100 TABLET ORAL DAILY
Qty: 90 TABLET | Refills: 3 | Status: SHIPPED | OUTPATIENT
Start: 2025-06-18

## 2025-06-18 RX ORDER — MAGNESIUM OXIDE 400 MG/1
400 TABLET ORAL DAILY
Qty: 90 TABLET | Refills: 3 | Status: SHIPPED | OUTPATIENT
Start: 2025-06-18

## 2025-06-18 RX ORDER — FLECAINIDE ACETATE 50 MG/1
50 TABLET ORAL EVERY 12 HOURS
Qty: 180 TABLET | Refills: 2 | Status: SHIPPED | OUTPATIENT
Start: 2025-06-18

## 2025-06-18 RX ORDER — FUROSEMIDE 20 MG/1
20 TABLET ORAL DAILY PRN
Qty: 90 TABLET | Refills: 3 | Status: SHIPPED | OUTPATIENT
Start: 2025-06-18

## 2025-06-18 RX ORDER — AMLODIPINE BESYLATE 5 MG/1
5 TABLET ORAL DAILY
Qty: 90 TABLET | Refills: 3 | Status: SHIPPED | OUTPATIENT
Start: 2025-06-18

## 2025-06-18 NOTE — PROGRESS NOTES
Chief Complaint   Patient presents with    Follow-up     Cardiac management    Lab     Last labs 5/2025, see chart.    Device     Had Medtronic PPM generator change on 5/28/25.    Shortness of Breath     Same as before, no worsening.    Med Refill     Will need refills on cardiac medications-90 day     Subjective     HPI    Brandie Nesbitt is a 74 y.o. female with HTN, hyperlipidemia, PVCs and SSS diagnosed in 2014 when she had a Medtronic pacemaker placed in [place]. She established care in January 2018 after moving to [place]. EKG at consult showed atrial pacing, occasional PVC. Lopressor 25 mg BID added.  Echo stable.  Stress in 2023 showed no ischemia.  She later had more PVCs, flecainide added. Battery reached end-of-life, she underwent generator change by Dr. Reynolds on 5/28/2025.      She returns today for follow up.  She feels well.  Has maintained weight loss with Ozempic.  A1c improved.  Blood pressure well-controlled.  Leg swelling improved with as needed dose of Lasix.  Labs 1/22/2025: A1c 5.8%, TSH 2.09, LDL 41, HDL 40, , GFR 90, normal LFT and electrolytes, H&H 14.1 and 43.4, platelets 327.      Cardiac History:    Past Surgical History:   Procedure Laterality Date    CARDIOVASCULAR STRESS TEST  01/08/2020    7 Min. 7. METS. 89% THR. BP- 158/84. EF > 70%. Breast Attenuation.    CARDIOVASCULAR STRESS TEST  02/03/2023    EF 59%, no ischemia    ECHO - CONVERTED  01/04/2018    EF 65%. RVSP- 31 mmHg    ECHO - CONVERTED  01/08/2020    EF 60%. LA- 3.9 Cm. Mild- Mod MR. RVSP- 34 mmHg.    ECHO - CONVERTED  12/30/2021    EF 60%. LA- 4.2 Cm. Mild MR. RVSP- 42 mmHg    ECHO - CONVERTED  07/12/2022    EF 65%. LA- 4.0. Trace-Mild MR. RVSP- 28 mmHg    ECHO - CONVERTED  02/03/2023    EF 60%, LVH, PVCs, RVSP 47mmHg, diastolic dysfunction    OTHER SURGICAL HISTORY      05/28/2025 PPM GENRATOR CHANGE PER DR. REYNOLDS    PACEMAKER IMPLANTATION  11/26/2014    Medtronic @ [facility]    PACEMAKER REPLACEMENT N/A  5/28/2025    Procedure: PPM generator change - dual-  CONSULT AND TREAT -MDT-  to be done in 6 wks.;  Surgeon: Mk Alejo DO;  Location: [facility] INVASIVE LOCATION;  Service: Cardiology;  Laterality: N/A;    US CAROTID UNILATERAL  01/02/2020    50% on the left, none on right    US CAROTID UNILATERAL  05/19/2021    <50% bilaterally     Current Outpatient Medications   Medication Sig Dispense Refill    amLODIPine (NORVASC) 5 MG tablet Take 1 tablet by mouth Daily. 90 tablet 3    aspirin 81 MG EC tablet Take 1 tablet by mouth Daily.      atorvastatin (LIPITOR) 10 MG tablet TAKE 1 TABLET DAILY 90 tablet 3    Cholecalciferol 25 MCG (1000 UT) tablet Take 1 tablet by mouth Daily.      cyanocobalamin (VITAMIN B-12) 2000 MCG tablet Take 1 tablet by mouth Daily.      flecainide (TAMBOCOR) 50 MG tablet Take 1 tablet by mouth Every 12 (Twelve) Hours. 180 tablet 2    furosemide (LASIX) 20 MG tablet Take 1 tablet by mouth Daily As Needed (swelling). 90 tablet 3    glipizide (GLUCOTROL XL) 5 MG ER tablet Take 1 tablet by mouth Daily.      hydroCHLOROthiazide 25 MG tablet Take 1 tablet by mouth Daily. 90 tablet 3    losartan (COZAAR) 100 MG tablet Take 1 tablet by mouth Daily. 90 tablet 3    magnesium oxide (MAG-OX) 400 MG tablet Take 1 tablet by mouth Daily. 90 tablet 3    meloxicam (MOBIC) 15 MG tablet Take 1 tablet by mouth Daily As Needed.      metFORMIN ER (GLUCOPHAGE-XR) 500 MG 24 hr tablet Take 1 tablet by mouth Daily With Breakfast.      metoprolol tartrate (LOPRESSOR) 25 MG tablet Take 1 tablet by mouth 2 (Two) Times a Day. 180 tablet 3    oxybutynin XL (DITROPAN-XL) 5 MG 24 hr tablet Take 1 tablet by mouth Daily.      Semaglutide, 2 MG/DOSE, (OZEMPIC) 8 MG/3ML solution pen-injector Inject 2 mg under the skin into the appropriate area as directed 1 (One) Time Per Week.       No current facility-administered medications for this visit.     Patient has no known allergies.    Past Medical History:   Diagnosis Date     Breast abrasion     Diabetes mellitus     History of kidney stones     History of lumpectomy     History of tonsillectomy     Hypertension     Pacemaker     Medtronic    Sleep apnea     Tennis elbow      Social History     Socioeconomic History    Marital status:    Tobacco Use    Smoking status: Never     Passive exposure: Past    Smokeless tobacco: Never   Vaping Use    Vaping status: Never Used   Substance and Sexual Activity    Alcohol use: Yes     Comment: rarely    Drug use: No    Sexual activity: Defer     Family History   Problem Relation Age of Onset    Heart attack Mother     Hypertension Mother     Hyperlipidemia Mother     Cancer Mother     Stroke Father     Hyperlipidemia Father     Hypertension Father     Cancer Father     Heart disease Sister     Diabetes Sister     Stroke Sister     Hypertension Sister     Diabetes Sister     Hypertension Sister     Hypertension Sister     Hypertension Brother     Heart disease Brother     Diabetes Brother     Stroke Brother     Stroke Maternal Grandmother     Heart attack Maternal Grandfather     Heart attack Paternal Grandmother     Stroke Paternal Grandmother     Heart attack Paternal Grandfather     Stroke Paternal Grandfather      Review of Systems   Constitutional: Positive for weight loss. Negative for decreased appetite and malaise/fatigue.   HENT: Negative.     Eyes:  Negative for blurred vision.   Cardiovascular:  Positive for palpitations (Occasional). Negative for chest pain, dyspnea on exertion, leg swelling and syncope.   Respiratory:  Negative for shortness of breath and sleep disturbances due to breathing.    Endocrine: Negative.    Hematologic/Lymphatic: Negative for bleeding problem. Does not bruise/bleed easily.   Skin: Negative.    Musculoskeletal:  Negative for falls and myalgias.   Gastrointestinal:  Negative for abdominal pain, heartburn and melena.   Genitourinary:  Negative for hematuria.   Neurological:  Negative for dizziness and  "light-headedness.   Psychiatric/Behavioral:  Negative for altered mental status.    Allergic/Immunologic: Negative.       Objective     /80 (BP Location: Left arm, Patient Position: Sitting)   Pulse 76   Ht 157.5 cm (62.01\")   Wt 73.2 kg (161 lb 6.4 oz)   BMI 29.51 kg/m²     Vitals and nursing note reviewed.   Constitutional:       General: Not in acute distress.     Appearance: Well-developed. Not diaphoretic.   Eyes:      Pupils: Pupils are equal, round, and reactive to light.   HENT:      Head: Normocephalic.   Pulmonary:      Effort: Pulmonary effort is normal. No respiratory distress.      Breath sounds: Normal breath sounds.   Cardiovascular:      Normal rate. Occasional ectopic beats. Regular rhythm.   Pulses:     Intact distal pulses.   Edema:     Peripheral edema absent.   Abdominal:      General: Bowel sounds are normal.      Palpations: Abdomen is soft.   Musculoskeletal: Normal range of motion.      Cervical back: Normal range of motion. Skin:     General: Skin is warm and dry.   Neurological:      Mental Status: Alert and oriented to person, place, and time.          ECG 12 Lead    Date/Time: 6/18/2025 10:16 AM  Performed by: Alisa Morse APRN    Authorized by: Alisa Morse APRN  Comparison: compared with previous ECG   Similar to previous ECG  Rhythm: paced  Ectopy: unifocal PVCs  Rate: normal  BPM: 76  ST Segments: ST segments normal    Clinical impression: non-specific ECG  Comments: Atrial paced  PVCs              Problem List Items Addressed This Visit          Cardiac and Vasculature    SSS (sick sinus syndrome)    Relevant Medications    flecainide (TAMBOCOR) 50 MG tablet    metoprolol tartrate (LOPRESSOR) 25 MG tablet    amLODIPine (NORVASC) 5 MG tablet    Essential hypertension    Relevant Medications    losartan (COZAAR) 100 MG tablet    metoprolol tartrate (LOPRESSOR) 25 MG tablet    amLODIPine (NORVASC) 5 MG tablet    furosemide (LASIX) 20 MG tablet    hydroCHLOROthiazide 25 MG " tablet    Other Relevant Orders    US Carotid Bilateral    Hypercholesteremia - Primary    Relevant Orders    US Carotid Bilateral    Palpitations    Relevant Medications    flecainide (TAMBOCOR) 50 MG tablet     Other Visit Diagnoses         Dizziness        Relevant Orders    US Carotid Bilateral           HTN  - Well-controlled  - Continue metoprolol, losartan, HCTZ, amlodipine    SSS  - EKG is atrial paced at 76 bpm  - Generator changed per Dr. Alejo, 5/2025    PVC  - EKG shows a few beats in bigeminy  - Continue flecainide and metoprolol  - QT C4 59 ms    Hyperlipidemia  - LDL at goal of 41  - Continue atorvastatin    Diabetes  - A1c improved from>7% to 5.8%  - Continue GLP-1 agonist  - Continue aspirin, statin, ARB    Patient requesting carotid ultrasound repeated.  In 2021, less than 50% stenosis bilaterally.  Will reevaluate.    Follow-up 6 months, sooner if needed.              Electronically signed by TROY Bonilla,  June 18, 2025 10:17 EDT

## 2025-07-01 ENCOUNTER — HOSPITAL ENCOUNTER (OUTPATIENT)
Dept: CARDIOLOGY | Facility: HOSPITAL | Age: 75
Discharge: HOME | End: 2025-07-01
Admitting: NURSE PRACTITIONER
Payer: MEDICARE

## 2025-07-01 DIAGNOSIS — E78.00 HYPERCHOLESTEREMIA: ICD-10-CM

## 2025-07-01 DIAGNOSIS — I10 ESSENTIAL HYPERTENSION: ICD-10-CM

## 2025-07-01 DIAGNOSIS — R42 DIZZINESS: ICD-10-CM

## 2025-07-01 PROCEDURE — 93880 EXTRACRANIAL BILAT STUDY: CPT

## 2025-07-31 ENCOUNTER — TELEPHONE (OUTPATIENT)
Dept: CARDIOLOGY | Facility: CLINIC | Age: 75
End: 2025-07-31

## 2025-08-08 ENCOUNTER — TELEPHONE (OUTPATIENT)
Dept: CARDIOLOGY | Facility: CLINIC | Age: 75
End: 2025-08-08
Payer: MEDICARE

## (undated) DEVICE — ELECTRD RETRN/GRND MEGADYNE SGL/PLT W/CORD 9FT DISP

## (undated) DEVICE — LIMB HOLDER, WRIST/ANKLE: Brand: DEROYAL

## (undated) DEVICE — ST INF PRI SMRTSTE 20DRP 2VLV 24ML 117

## (undated) DEVICE — DECANT BG O JET

## (undated) DEVICE — DRSNG SURESITE123 4X4.8IN

## (undated) DEVICE — TUBING, SUCTION, 1/4" X 10', STRAIGHT: Brand: MEDLINE

## (undated) DEVICE — ST EXT IV SMRTSTE 2VLV FIX M LL 6ML 41

## (undated) DEVICE — SOL NACL 0.9PCT 1000ML

## (undated) DEVICE — ADULT, W/LG. BACK PAD, RADIOTRANSPARENT ELEMENT AND LEAD WIRE COMPATIBLE W/: Brand: DEFIBRILLATION ELECTRODES

## (undated) DEVICE — PAD,NON-ADHERENT,3X8,STERILE,LF,1/PK: Brand: MEDLINE

## (undated) DEVICE — CANN NASL CAPNOFLEX SMPL CO2/O2 W/O2/DEL A/

## (undated) DEVICE — SET PRIMARY GRVTY 10DP MALE LL 104IN

## (undated) DEVICE — YANKAUER,BULB TIP,W/O VENT,RIGID,STERILE: Brand: MEDLINE

## (undated) DEVICE — PLASMABLADE PS210-030S 3.0S LOCK: Brand: PLASMABLADE™

## (undated) DEVICE — LEX ELECTRO PHYSIOLOGY: Brand: MEDLINE INDUSTRIES, INC.

## (undated) DEVICE — CAUTERY TIP POLISHER: Brand: DEVON